# Patient Record
Sex: MALE | Race: WHITE | URBAN - METROPOLITAN AREA
[De-identification: names, ages, dates, MRNs, and addresses within clinical notes are randomized per-mention and may not be internally consistent; named-entity substitution may affect disease eponyms.]

---

## 2017-05-05 ENCOUNTER — OPTICAL OFFICE (OUTPATIENT)
Dept: URBAN - METROPOLITAN AREA CLINIC 143 | Facility: CLINIC | Age: 22
Setting detail: OPHTHALMOLOGY
End: 2017-05-05

## 2017-05-05 ENCOUNTER — DOCTOR'S OFFICE (OUTPATIENT)
Dept: URBAN - METROPOLITAN AREA CLINIC 136 | Facility: CLINIC | Age: 22
Setting detail: OPHTHALMOLOGY
End: 2017-05-05
Payer: COMMERCIAL

## 2017-05-05 DIAGNOSIS — H52.13: ICD-10-CM

## 2017-05-05 DIAGNOSIS — H52.223: ICD-10-CM

## 2017-05-05 PROCEDURE — 92310 CONTACT LENS FITTING OU: CPT | Performed by: OPTOMETRIST

## 2017-05-05 PROCEDURE — S0500 DISPOS CONT LENS: HCPCS | Performed by: OPTOMETRIST

## 2017-05-05 PROCEDURE — 92014 COMPRE OPH EXAM EST PT 1/>: CPT | Performed by: OPTOMETRIST

## 2017-05-05 ASSESSMENT — REFRACTION_MANIFEST
OD_VA3: 20/
OD_VA1: 20/
OS_VA1: 20/
OD_VA1: 20/
OU_VA: 20/
OS_VA2: 20/
OS_VA3: 20/
OS_VA2: 20/
OD_VA3: 20/
OD_VA2: 20/
OD_VA2: 20/
OS_VA1: 20/
OS_VA3: 20/
OU_VA: 20/

## 2017-05-05 ASSESSMENT — REFRACTION_CURRENTRX
OD_OVR_VA: 20/
OS_OVR_VA: 20/
OD_OVR_VA: 20/
OS_OVR_VA: 20/
OD_OVR_VA: 20/
OS_OVR_VA: 20/

## 2017-05-05 ASSESSMENT — REFRACTION_OUTSIDERX
OD_CYLINDER: -0.50
OS_CYLINDER: SPH
OS_VA1: 20/20
OD_VA3: 20/
OS_VA3: 20/
OU_VA: 20/20
OD_VA2: 20/20
OD_VA1: 20/20
OS_SPHERE: -3.75
OD_AXIS: 180
OS_VA2: 20/20
OD_SPHERE: -3.75

## 2017-05-05 ASSESSMENT — CONFRONTATIONAL VISUAL FIELD TEST (CVF)
OD_FINDINGS: FULL
OS_FINDINGS: FULL

## 2017-05-05 ASSESSMENT — VISUAL ACUITY
OD_BCVA: 20/20
OS_BCVA: 20/25+2

## 2017-07-03 ENCOUNTER — OPTICAL OFFICE (OUTPATIENT)
Dept: URBAN - METROPOLITAN AREA CLINIC 143 | Facility: CLINIC | Age: 22
Setting detail: OPHTHALMOLOGY
End: 2017-07-03

## 2017-07-03 DIAGNOSIS — H52.13: ICD-10-CM

## 2017-07-03 PROCEDURE — S0500 DISPOS CONT LENS: HCPCS | Performed by: OPTOMETRIST

## 2017-09-22 ENCOUNTER — OPTICAL OFFICE (OUTPATIENT)
Dept: URBAN - METROPOLITAN AREA CLINIC 143 | Facility: CLINIC | Age: 22
Setting detail: OPHTHALMOLOGY
End: 2017-09-22

## 2017-09-22 DIAGNOSIS — H52.223: ICD-10-CM

## 2017-09-22 PROCEDURE — S0500 DISPOS CONT LENS: HCPCS | Performed by: OPTOMETRIST

## 2018-03-05 ENCOUNTER — OPTICAL OFFICE (OUTPATIENT)
Dept: URBAN - METROPOLITAN AREA CLINIC 143 | Facility: CLINIC | Age: 23
Setting detail: OPHTHALMOLOGY
End: 2018-03-05

## 2018-03-05 DIAGNOSIS — H52.13: ICD-10-CM

## 2018-03-05 PROCEDURE — S0500 DISPOS CONT LENS: HCPCS | Performed by: OPTOMETRIST

## 2018-05-07 ENCOUNTER — DOCTOR'S OFFICE (OUTPATIENT)
Dept: URBAN - METROPOLITAN AREA CLINIC 136 | Facility: CLINIC | Age: 23
Setting detail: OPHTHALMOLOGY
End: 2018-05-07
Payer: COMMERCIAL

## 2018-05-07 DIAGNOSIS — H52.223: ICD-10-CM

## 2018-05-07 DIAGNOSIS — H52.13: ICD-10-CM

## 2018-05-07 PROCEDURE — 92014 COMPRE OPH EXAM EST PT 1/>: CPT | Performed by: OPTOMETRIST

## 2018-05-07 ASSESSMENT — REFRACTION_CURRENTRX
OS_OVR_VA: 20/
OD_OVR_VA: 20/
OD_OVR_VA: 20/
OS_OVR_VA: 20/
OD_OVR_VA: 20/
OS_OVR_VA: 20/

## 2018-05-07 ASSESSMENT — CONFRONTATIONAL VISUAL FIELD TEST (CVF)
OS_FINDINGS: FULL
OD_FINDINGS: FULL

## 2018-05-07 ASSESSMENT — REFRACTION_MANIFEST
OD_VA2: 20/
OD_VA1: 20/
OS_VA3: 20/
OS_VA1: 20/
OD_VA2: 20/
OS_VA3: 20/
OU_VA: 20/
OD_VA3: 20/
OU_VA: 20/
OD_VA3: 20/
OD_VA1: 20/
OS_VA1: 20/
OS_VA2: 20/
OS_VA2: 20/

## 2018-05-07 ASSESSMENT — REFRACTION_OUTSIDERX
OS_VA3: 20/
OD_CYLINDER: -0.50
OD_VA2: 20/20
OD_SPHERE: -3.75
OD_AXIS: 180
OS_VA2: 20/20
OS_SPHERE: -3.75
OS_CYLINDER: SPH
OD_VA1: 20/20
OU_VA: 20/20
OD_VA3: 20/
OS_VA1: 20/20

## 2018-05-07 ASSESSMENT — VISUAL ACUITY
OS_BCVA: 20/20-1
OD_BCVA: 20/20

## 2019-10-01 ENCOUNTER — DOCTOR'S OFFICE (OUTPATIENT)
Dept: URBAN - METROPOLITAN AREA CLINIC 136 | Facility: CLINIC | Age: 24
Setting detail: OPHTHALMOLOGY
End: 2019-10-01
Payer: COMMERCIAL

## 2019-10-01 DIAGNOSIS — H52.13: ICD-10-CM

## 2019-10-01 DIAGNOSIS — H52.223: ICD-10-CM

## 2019-10-01 PROCEDURE — 92014 COMPRE OPH EXAM EST PT 1/>: CPT | Performed by: OPTOMETRIST

## 2019-10-01 PROCEDURE — 92310 CONTACT LENS FITTING OU: CPT | Performed by: OPTOMETRIST

## 2019-10-01 PROCEDURE — 92015 DETERMINE REFRACTIVE STATE: CPT | Performed by: OPTOMETRIST

## 2019-10-01 ASSESSMENT — REFRACTION_CURRENTRX
OS_OVR_VA: 20/
OS_OVR_VA: 20/
OD_OVR_VA: 20/
OD_OVR_VA: 20/
OS_OVR_VA: 20/
OD_OVR_VA: 20/

## 2019-10-01 ASSESSMENT — REFRACTION_AUTOREFRACTION
OD_AXIS: 173
OD_CYLINDER: -1.00
OS_SPHERE: -3.00
OD_SPHERE: -3.50
OS_CYLINDER: -1.25
OS_AXIS: 6

## 2019-10-01 ASSESSMENT — REFRACTION_MANIFEST
OD_VA1: 20/
OD_VA2: 20/20
OS_VA2: 20/
OS_VA3: 20/
OS_VA3: 20/
OD_SPHERE: -3.75
OD_VA2: 20/
OD_VA3: 20/
OU_VA: 20/20
OS_SPHERE: -3.75
OS_CYLINDER: SPH
OD_AXIS: 180
OS_VA1: 20/
OS_VA1: 20/20
OD_CYLINDER: -0.50
OU_VA: 20/
OS_VA2: 20/20
OD_VA1: 20/20
OD_VA3: 20/

## 2019-10-01 ASSESSMENT — SPHEQUIV_DERIVED
OD_SPHEQUIV: -4
OD_SPHEQUIV: -4
OS_SPHEQUIV: -3.625

## 2019-10-01 ASSESSMENT — VISUAL ACUITY
OS_BCVA: 20/20-1
OD_BCVA: 20/20

## 2019-10-01 ASSESSMENT — CONFRONTATIONAL VISUAL FIELD TEST (CVF)
OD_FINDINGS: FULL
OS_FINDINGS: FULL

## 2021-07-05 ENCOUNTER — APPOINTMENT (EMERGENCY)
Dept: RADIOLOGY | Facility: HOSPITAL | Age: 26
DRG: 364 | End: 2021-07-05
Payer: COMMERCIAL

## 2021-07-05 ENCOUNTER — HOSPITAL ENCOUNTER (EMERGENCY)
Facility: HOSPITAL | Age: 26
Discharge: HOME | DRG: 364 | End: 2021-07-05
Attending: EMERGENCY MEDICINE | Admitting: EMERGENCY MEDICINE
Payer: COMMERCIAL

## 2021-07-05 VITALS
TEMPERATURE: 98.1 F | SYSTOLIC BLOOD PRESSURE: 147 MMHG | HEIGHT: 67 IN | OXYGEN SATURATION: 99 % | RESPIRATION RATE: 18 BRPM | WEIGHT: 180 LBS | DIASTOLIC BLOOD PRESSURE: 78 MMHG | BODY MASS INDEX: 28.25 KG/M2 | HEART RATE: 58 BPM

## 2021-07-05 DIAGNOSIS — L08.9 INFECTED DOG BITE OF FINGER, INITIAL ENCOUNTER: Primary | ICD-10-CM
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
## 2021-07-05 DIAGNOSIS — S61.259A INFECTED DOG BITE OF FINGER, INITIAL ENCOUNTER: Primary | ICD-10-CM

## 2021-07-05 LAB
ALBUMIN SERPL BCP-MCNC: 4.3 G/DL (ref 3.5–5)
ALP SERPL-CCNC: 94 U/L (ref 46–116)
ALT SERPL W P-5'-P-CCNC: 41 U/L (ref 12–78)
ANION GAP SERPL CALCULATED.3IONS-SCNC: 10 MMOL/L (ref 4–13)
APTT PPP: 27 SECONDS (ref 23–37)
AST SERPL W P-5'-P-CCNC: 24 U/L (ref 5–45)
BASOPHILS # BLD AUTO: 0.04 THOUSANDS/ΜL (ref 0–0.1)
BASOPHILS NFR BLD AUTO: 0 % (ref 0–1)
BILIRUB SERPL-MCNC: 0.48 MG/DL (ref 0.2–1)
BUN SERPL-MCNC: 20 MG/DL (ref 5–25)
CALCIUM SERPL-MCNC: 8.9 MG/DL (ref 8.3–10.1)
CHLORIDE SERPL-SCNC: 103 MMOL/L (ref 100–108)
CO2 SERPL-SCNC: 27 MMOL/L (ref 21–32)
CREAT SERPL-MCNC: 1.22 MG/DL (ref 0.6–1.3)
CRP SERPL HS-MCNC: <0.9 MG/L
EOSINOPHIL # BLD AUTO: 0.13 THOUSAND/ΜL (ref 0–0.61)
EOSINOPHIL NFR BLD AUTO: 1 % (ref 0–6)
ERYTHROCYTE [DISTWIDTH] IN BLOOD BY AUTOMATED COUNT: 12.7 % (ref 11.6–15.1)
GFR SERPL CREATININE-BSD FRML MDRD: 82 ML/MIN/1.73SQ M
GLUCOSE SERPL-MCNC: 98 MG/DL (ref 65–140)
HCT VFR BLD AUTO: 45.4 % (ref 36.5–49.3)
HGB BLD-MCNC: 15.1 G/DL (ref 12–17)
IMM GRANULOCYTES # BLD AUTO: 0.05 THOUSAND/UL (ref 0–0.2)
IMM GRANULOCYTES NFR BLD AUTO: 1 % (ref 0–2)
INR PPP: 0.96 (ref 0.84–1.19)
LACTATE SERPL-SCNC: 1 MMOL/L (ref 0.5–2)
LYMPHOCYTES # BLD AUTO: 2.75 THOUSANDS/ΜL (ref 0.6–4.47)
LYMPHOCYTES NFR BLD AUTO: 25 % (ref 14–44)
MCH RBC QN AUTO: 27.6 PG (ref 26.8–34.3)
MCHC RBC AUTO-ENTMCNC: 33.3 G/DL (ref 31.4–37.4)
MCV RBC AUTO: 83 FL (ref 82–98)
MONOCYTES # BLD AUTO: 0.74 THOUSAND/ΜL (ref 0.17–1.22)
MONOCYTES NFR BLD AUTO: 7 % (ref 4–12)
NEUTROPHILS # BLD AUTO: 7.26 THOUSANDS/ΜL (ref 1.85–7.62)
NEUTS SEG NFR BLD AUTO: 66 % (ref 43–75)
NRBC BLD AUTO-RTO: 0 /100 WBCS
PLATELET # BLD AUTO: 240 THOUSANDS/UL (ref 149–390)
PMV BLD AUTO: 10.8 FL (ref 8.9–12.7)
POTASSIUM SERPL-SCNC: 4.1 MMOL/L (ref 3.5–5.3)
PROT SERPL-MCNC: 7.4 G/DL (ref 6.4–8.2)
PROTHROMBIN TIME: 12.9 SECONDS (ref 11.6–14.5)
RBC # BLD AUTO: 5.47 MILLION/UL (ref 3.88–5.62)
SODIUM SERPL-SCNC: 140 MMOL/L (ref 136–145)
WBC # BLD AUTO: 10.97 THOUSAND/UL (ref 4.31–10.16)

## 2021-07-05 PROCEDURE — 36415 COLL VENOUS BLD VENIPUNCTURE: CPT | Performed by: EMERGENCY MEDICINE

## 2021-07-05 PROCEDURE — 96365 THER/PROPH/DIAG IV INF INIT: CPT

## 2021-07-05 PROCEDURE — 83605 ASSAY OF LACTIC ACID: CPT | Performed by: EMERGENCY MEDICINE

## 2021-07-05 PROCEDURE — 85730 THROMBOPLASTIN TIME PARTIAL: CPT | Performed by: EMERGENCY MEDICINE

## 2021-07-05 PROCEDURE — 85610 PROTHROMBIN TIME: CPT | Performed by: EMERGENCY MEDICINE

## 2021-07-05 PROCEDURE — 90471 IMMUNIZATION ADMIN: CPT

## 2021-07-05 PROCEDURE — 80053 COMPREHEN METABOLIC PANEL: CPT | Performed by: EMERGENCY MEDICINE

## 2021-07-05 PROCEDURE — 99284 EMERGENCY DEPT VISIT MOD MDM: CPT

## 2021-07-05 PROCEDURE — 99284 EMERGENCY DEPT VISIT MOD MDM: CPT | Performed by: EMERGENCY MEDICINE

## 2021-07-05 PROCEDURE — 96375 TX/PRO/DX INJ NEW DRUG ADDON: CPT

## 2021-07-05 PROCEDURE — 73140 X-RAY EXAM OF FINGER(S): CPT

## 2021-07-05 PROCEDURE — 86141 C-REACTIVE PROTEIN HS: CPT | Performed by: EMERGENCY MEDICINE

## 2021-07-05 PROCEDURE — 85025 COMPLETE CBC W/AUTO DIFF WBC: CPT | Performed by: EMERGENCY MEDICINE

## 2021-07-05 PROCEDURE — 90715 TDAP VACCINE 7 YRS/> IM: CPT | Performed by: EMERGENCY MEDICINE

## 2021-07-05 PROCEDURE — 87040 BLOOD CULTURE FOR BACTERIA: CPT | Performed by: EMERGENCY MEDICINE

## 2021-07-05 RX ORDER — AMOXICILLIN AND CLAVULANATE POTASSIUM 875; 125 MG/1; MG/1
1 TABLET, FILM COATED ORAL 2 TIMES DAILY
Qty: 14 TABLET | Refills: 0 | Status: SHIPPED | OUTPATIENT
Start: 2021-07-06 | End: 2021-07-06 | Stop reason: SDUPTHER

## 2021-07-05 RX ORDER — KETOROLAC TROMETHAMINE 30 MG/ML
30 INJECTION, SOLUTION INTRAMUSCULAR; INTRAVENOUS ONCE
Status: COMPLETED | OUTPATIENT
Start: 2021-07-05 | End: 2021-07-05

## 2021-07-05 RX ADMIN — KETOROLAC TROMETHAMINE 30 MG: 30 INJECTION, SOLUTION INTRAMUSCULAR at 20:38

## 2021-07-05 RX ADMIN — TETANUS TOXOID, REDUCED DIPHTHERIA TOXOID AND ACELLULAR PERTUSSIS VACCINE, ADSORBED 0.5 ML: 5; 2.5; 8; 8; 2.5 SUSPENSION INTRAMUSCULAR at 20:39

## 2021-07-05 RX ADMIN — SODIUM CHLORIDE 3 G: 9 INJECTION, SOLUTION INTRAVENOUS at 20:43

## 2021-07-05 NOTE — Clinical Note
Laura Cagle was seen and treated in our emergency department on 7/5/2021  Diagnosis:     Kodi    He may return on this date: 07/07/2021         If you have any questions or concerns, please don't hesitate to call        Bernie Hameed RN    ______________________________           _______________          _______________  Hospital Representative                              Date                                Time

## 2021-07-05 NOTE — Clinical Note
Keysha Saeed was seen and treated in our emergency department on 7/5/2021  Diagnosis:     Kodi    He may return on this date: 07/07/2021         If you have any questions or concerns, please don't hesitate to call        Sona Dickinson RN    ______________________________           _______________          _______________  Hospital Representative                              Date                                Time

## 2021-07-05 NOTE — Clinical Note
Jonn Bowling was seen and treated in our emergency department on 7/5/2021  Diagnosis:     Sumit Litter  may return to work on return date  He may return on this date: 07/07/2021         If you have any questions or concerns, please don't hesitate to call        Nabil Araujo RN    ______________________________           _______________          _______________  Hospital Representative                              Date                                Time

## 2021-07-05 NOTE — Clinical Note
Pawan Po was seen and treated in our emergency department on 7/5/2021  Diagnosis:     Rizwana Allen  may return to work on return date  He may return on this date: 07/07/2021         If you have any questions or concerns, please don't hesitate to call        Josh Franco RN    ______________________________           _______________          _______________  Hospital Representative                              Date                                Time

## 2021-07-05 NOTE — Clinical Note
Cecil Olivarez was seen and treated in our emergency department on 7/5/2021  Diagnosis:     Kodi    He may return on this date: 07/07/2021         If you have any questions or concerns, please don't hesitate to call        Poly Walker RN    ______________________________           _______________          _______________  Hospital Representative                              Date                                Time

## 2021-07-06 ENCOUNTER — ANESTHESIA EVENT (OUTPATIENT)
Dept: PERIOP | Facility: HOSPITAL | Age: 26
DRG: 364 | End: 2021-07-06
Payer: COMMERCIAL

## 2021-07-06 ENCOUNTER — ANESTHESIA (OUTPATIENT)
Dept: PERIOP | Facility: HOSPITAL | Age: 26
DRG: 364 | End: 2021-07-06
Payer: COMMERCIAL

## 2021-07-06 ENCOUNTER — HOSPITAL ENCOUNTER (INPATIENT)
Facility: HOSPITAL | Age: 26
LOS: 2 days | Discharge: HOME | DRG: 364 | End: 2021-07-08
Attending: EMERGENCY MEDICINE | Admitting: SURGERY
Payer: COMMERCIAL

## 2021-07-06 DIAGNOSIS — S61.259D DOG BITE OF FINGER, SUBSEQUENT ENCOUNTER: ICD-10-CM

## 2021-07-06 DIAGNOSIS — M65.9 FLEXOR TENOSYNOVITIS OF FINGER: Primary | ICD-10-CM
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
## 2021-07-06 DIAGNOSIS — L08.9 INFECTED DOG BITE OF FINGER, INITIAL ENCOUNTER: ICD-10-CM
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
## 2021-07-06 DIAGNOSIS — S61.259A INFECTED DOG BITE OF FINGER, INITIAL ENCOUNTER: ICD-10-CM

## 2021-07-06 LAB
ALBUMIN SERPL BCP-MCNC: 4.3 G/DL (ref 3.5–5)
ALP SERPL-CCNC: 100 U/L (ref 46–116)
ALT SERPL W P-5'-P-CCNC: 37 U/L (ref 12–78)
ANION GAP SERPL CALCULATED.3IONS-SCNC: 7 MMOL/L (ref 4–13)
AST SERPL W P-5'-P-CCNC: 16 U/L (ref 5–45)
BASOPHILS # BLD AUTO: 0.02 THOUSANDS/ΜL (ref 0–0.1)
BASOPHILS NFR BLD AUTO: 0 % (ref 0–1)
BILIRUB SERPL-MCNC: 0.57 MG/DL (ref 0.2–1)
BUN SERPL-MCNC: 17 MG/DL (ref 5–25)
CALCIUM SERPL-MCNC: 8.8 MG/DL (ref 8.3–10.1)
CHLORIDE SERPL-SCNC: 107 MMOL/L (ref 100–108)
CO2 SERPL-SCNC: 28 MMOL/L (ref 21–32)
CREAT SERPL-MCNC: 1.07 MG/DL (ref 0.6–1.3)
EOSINOPHIL # BLD AUTO: 0.09 THOUSAND/ΜL (ref 0–0.61)
EOSINOPHIL NFR BLD AUTO: 1 % (ref 0–6)
ERYTHROCYTE [DISTWIDTH] IN BLOOD BY AUTOMATED COUNT: 12.8 % (ref 11.6–15.1)
GFR SERPL CREATININE-BSD FRML MDRD: 96 ML/MIN/1.73SQ M
GLUCOSE SERPL-MCNC: 100 MG/DL (ref 65–140)
HCT VFR BLD AUTO: 46.6 % (ref 36.5–49.3)
HGB BLD-MCNC: 15.3 G/DL (ref 12–17)
IMM GRANULOCYTES # BLD AUTO: 0.03 THOUSAND/UL (ref 0–0.2)
IMM GRANULOCYTES NFR BLD AUTO: 0 % (ref 0–2)
LYMPHOCYTES # BLD AUTO: 1.5 THOUSANDS/ΜL (ref 0.6–4.47)
LYMPHOCYTES NFR BLD AUTO: 21 % (ref 14–44)
MCH RBC QN AUTO: 27.5 PG (ref 26.8–34.3)
MCHC RBC AUTO-ENTMCNC: 32.8 G/DL (ref 31.4–37.4)
MCV RBC AUTO: 84 FL (ref 82–98)
MONOCYTES # BLD AUTO: 0.57 THOUSAND/ΜL (ref 0.17–1.22)
MONOCYTES NFR BLD AUTO: 8 % (ref 4–12)
NEUTROPHILS # BLD AUTO: 5.04 THOUSANDS/ΜL (ref 1.85–7.62)
NEUTS SEG NFR BLD AUTO: 70 % (ref 43–75)
NRBC BLD AUTO-RTO: 0 /100 WBCS
PLATELET # BLD AUTO: 190 THOUSANDS/UL (ref 149–390)
PLATELET # BLD AUTO: 201 THOUSANDS/UL (ref 149–390)
PMV BLD AUTO: 10.6 FL (ref 8.9–12.7)
PMV BLD AUTO: 10.6 FL (ref 8.9–12.7)
POTASSIUM SERPL-SCNC: 4.3 MMOL/L (ref 3.5–5.3)
PROT SERPL-MCNC: 7.5 G/DL (ref 6.4–8.2)
RBC # BLD AUTO: 5.56 MILLION/UL (ref 3.88–5.62)
SODIUM SERPL-SCNC: 142 MMOL/L (ref 136–145)
WBC # BLD AUTO: 7.25 THOUSAND/UL (ref 4.31–10.16)

## 2021-07-06 PROCEDURE — 80053 COMPREHEN METABOLIC PANEL: CPT | Performed by: PHYSICIAN ASSISTANT

## 2021-07-06 PROCEDURE — 99254 IP/OBS CNSLTJ NEW/EST MOD 60: CPT | Performed by: SURGERY

## 2021-07-06 PROCEDURE — 96375 TX/PRO/DX INJ NEW DRUG ADDON: CPT

## 2021-07-06 PROCEDURE — 36415 COLL VENOUS BLD VENIPUNCTURE: CPT | Performed by: PHYSICIAN ASSISTANT

## 2021-07-06 PROCEDURE — 85025 COMPLETE CBC W/AUTO DIFF WBC: CPT | Performed by: PHYSICIAN ASSISTANT

## 2021-07-06 PROCEDURE — 85049 AUTOMATED PLATELET COUNT: CPT | Performed by: PHYSICIAN ASSISTANT

## 2021-07-06 PROCEDURE — 96365 THER/PROPH/DIAG IV INF INIT: CPT

## 2021-07-06 PROCEDURE — 99285 EMERGENCY DEPT VISIT HI MDM: CPT | Performed by: PHYSICIAN ASSISTANT

## 2021-07-06 PROCEDURE — 87075 CULTR BACTERIA EXCEPT BLOOD: CPT | Performed by: SURGERY

## 2021-07-06 PROCEDURE — 87205 SMEAR GRAM STAIN: CPT | Performed by: SURGERY

## 2021-07-06 PROCEDURE — 26020 DRAIN HAND TENDON SHEATH: CPT | Performed by: SURGERY

## 2021-07-06 PROCEDURE — 99284 EMERGENCY DEPT VISIT MOD MDM: CPT

## 2021-07-06 PROCEDURE — 87070 CULTURE OTHR SPECIMN AEROBIC: CPT | Performed by: SURGERY

## 2021-07-06 PROCEDURE — 99222 1ST HOSP IP/OBS MODERATE 55: CPT | Performed by: SURGERY

## 2021-07-06 PROCEDURE — 0J9J0ZZ DRAINAGE OF RIGHT HAND SUBCUTANEOUS TISSUE AND FASCIA, OPEN APPROACH: ICD-10-PCS | Performed by: SURGERY

## 2021-07-06 RX ORDER — KETOROLAC TROMETHAMINE 30 MG/ML
15 INJECTION, SOLUTION INTRAMUSCULAR; INTRAVENOUS ONCE
Status: COMPLETED | OUTPATIENT
Start: 2021-07-06 | End: 2021-07-06

## 2021-07-06 RX ORDER — HYDROMORPHONE HCL/PF 1 MG/ML
0.5 SYRINGE (ML) INJECTION
Status: DISCONTINUED | OUTPATIENT
Start: 2021-07-06 | End: 2021-07-08 | Stop reason: HOSPADM

## 2021-07-06 RX ORDER — FENTANYL CITRATE/PF 50 MCG/ML
50 SYRINGE (ML) INJECTION
Status: DISCONTINUED | OUTPATIENT
Start: 2021-07-06 | End: 2021-07-06 | Stop reason: HOSPADM

## 2021-07-06 RX ORDER — PROPOFOL 10 MG/ML
INJECTION, EMULSION INTRAVENOUS CONTINUOUS PRN
Status: DISCONTINUED | OUTPATIENT
Start: 2021-07-06 | End: 2021-07-06

## 2021-07-06 RX ORDER — ONDANSETRON 2 MG/ML
4 INJECTION INTRAMUSCULAR; INTRAVENOUS EVERY 6 HOURS PRN
Status: DISCONTINUED | OUTPATIENT
Start: 2021-07-06 | End: 2021-07-08 | Stop reason: HOSPADM

## 2021-07-06 RX ORDER — OXYCODONE HYDROCHLORIDE 5 MG/1
5 TABLET ORAL EVERY 4 HOURS PRN
Status: DISCONTINUED | OUTPATIENT
Start: 2021-07-06 | End: 2021-07-08 | Stop reason: HOSPADM

## 2021-07-06 RX ORDER — OXYCODONE HYDROCHLORIDE 5 MG/1
2.5 TABLET ORAL EVERY 4 HOURS PRN
Status: DISCONTINUED | OUTPATIENT
Start: 2021-07-06 | End: 2021-07-08 | Stop reason: HOSPADM

## 2021-07-06 RX ORDER — AMOXICILLIN AND CLAVULANATE POTASSIUM 875; 125 MG/1; MG/1
1 TABLET, FILM COATED ORAL 2 TIMES DAILY
Qty: 14 TABLET | Refills: 0 | Status: ON HOLD | OUTPATIENT
Start: 2021-07-06 | End: 2021-07-08 | Stop reason: SDUPTHER

## 2021-07-06 RX ORDER — ONDANSETRON 2 MG/ML
INJECTION INTRAMUSCULAR; INTRAVENOUS AS NEEDED
Status: DISCONTINUED | OUTPATIENT
Start: 2021-07-06 | End: 2021-07-06

## 2021-07-06 RX ORDER — CEFAZOLIN SODIUM 2 G/50ML
2000 SOLUTION INTRAVENOUS ONCE
Status: CANCELLED | OUTPATIENT
Start: 2021-07-06 | End: 2021-07-06

## 2021-07-06 RX ORDER — ONDANSETRON 2 MG/ML
4 INJECTION INTRAMUSCULAR; INTRAVENOUS ONCE AS NEEDED
Status: DISCONTINUED | OUTPATIENT
Start: 2021-07-06 | End: 2021-07-06 | Stop reason: HOSPADM

## 2021-07-06 RX ORDER — FENTANYL CITRATE 50 UG/ML
INJECTION, SOLUTION INTRAMUSCULAR; INTRAVENOUS AS NEEDED
Status: DISCONTINUED | OUTPATIENT
Start: 2021-07-06 | End: 2021-07-06

## 2021-07-06 RX ORDER — KETAMINE HYDROCHLORIDE 50 MG/ML
INJECTION, SOLUTION, CONCENTRATE INTRAMUSCULAR; INTRAVENOUS AS NEEDED
Status: DISCONTINUED | OUTPATIENT
Start: 2021-07-06 | End: 2021-07-06

## 2021-07-06 RX ORDER — LANOLIN ALCOHOL/MO/W.PET/CERES
3 CREAM (GRAM) TOPICAL
Status: DISCONTINUED | OUTPATIENT
Start: 2021-07-06 | End: 2021-07-08 | Stop reason: HOSPADM

## 2021-07-06 RX ORDER — CEFAZOLIN SODIUM 2 G/50ML
SOLUTION INTRAVENOUS AS NEEDED
Status: DISCONTINUED | OUTPATIENT
Start: 2021-07-06 | End: 2021-07-06

## 2021-07-06 RX ORDER — SODIUM CHLORIDE, SODIUM GLUCONATE, SODIUM ACETATE, POTASSIUM CHLORIDE, MAGNESIUM CHLORIDE, SODIUM PHOSPHATE, DIBASIC, AND POTASSIUM PHOSPHATE .53; .5; .37; .037; .03; .012; .00082 G/100ML; G/100ML; G/100ML; G/100ML; G/100ML; G/100ML; G/100ML
125 INJECTION, SOLUTION INTRAVENOUS CONTINUOUS
Status: DISCONTINUED | OUTPATIENT
Start: 2021-07-06 | End: 2021-07-07

## 2021-07-06 RX ORDER — MIDAZOLAM HYDROCHLORIDE 2 MG/2ML
INJECTION, SOLUTION INTRAMUSCULAR; INTRAVENOUS AS NEEDED
Status: DISCONTINUED | OUTPATIENT
Start: 2021-07-06 | End: 2021-07-06

## 2021-07-06 RX ORDER — IBUPROFEN 600 MG/1
600 TABLET ORAL EVERY 6 HOURS PRN
Status: DISCONTINUED | OUTPATIENT
Start: 2021-07-06 | End: 2021-07-08 | Stop reason: HOSPADM

## 2021-07-06 RX ORDER — ACETAMINOPHEN 325 MG/1
975 TABLET ORAL EVERY 8 HOURS SCHEDULED
Status: DISCONTINUED | OUTPATIENT
Start: 2021-07-06 | End: 2021-07-08 | Stop reason: HOSPADM

## 2021-07-06 RX ORDER — PROPOFOL 10 MG/ML
INJECTION, EMULSION INTRAVENOUS AS NEEDED
Status: DISCONTINUED | OUTPATIENT
Start: 2021-07-06 | End: 2021-07-06

## 2021-07-06 RX ORDER — SODIUM CHLORIDE, SODIUM GLUCONATE, SODIUM ACETATE, POTASSIUM CHLORIDE, MAGNESIUM CHLORIDE, SODIUM PHOSPHATE, DIBASIC, AND POTASSIUM PHOSPHATE .53; .5; .37; .037; .03; .012; .00082 G/100ML; G/100ML; G/100ML; G/100ML; G/100ML; G/100ML; G/100ML
100 INJECTION, SOLUTION INTRAVENOUS CONTINUOUS
Status: CANCELLED | OUTPATIENT
Start: 2021-07-06

## 2021-07-06 RX ADMIN — KETAMINE HYDROCHLORIDE 10 MG: 50 INJECTION INTRAMUSCULAR; INTRAVENOUS at 17:40

## 2021-07-06 RX ADMIN — VANCOMYCIN HYDROCHLORIDE 1750 MG: 1 INJECTION, POWDER, LYOPHILIZED, FOR SOLUTION INTRAVENOUS at 14:59

## 2021-07-06 RX ADMIN — SODIUM CHLORIDE, SODIUM GLUCONATE, SODIUM ACETATE, POTASSIUM CHLORIDE, MAGNESIUM CHLORIDE, SODIUM PHOSPHATE, DIBASIC, AND POTASSIUM PHOSPHATE 125 ML/HR: .53; .5; .37; .037; .03; .012; .00082 INJECTION, SOLUTION INTRAVENOUS at 15:55

## 2021-07-06 RX ADMIN — Medication 3 MG: at 21:01

## 2021-07-06 RX ADMIN — PROPOFOL 20 MG: 10 INJECTION, EMULSION INTRAVENOUS at 17:33

## 2021-07-06 RX ADMIN — IBUPROFEN 600 MG: 600 TABLET ORAL at 20:54

## 2021-07-06 RX ADMIN — FENTANYL CITRATE 25 MCG: 50 INJECTION, SOLUTION INTRAMUSCULAR; INTRAVENOUS at 17:26

## 2021-07-06 RX ADMIN — PROPOFOL 30 MG: 10 INJECTION, EMULSION INTRAVENOUS at 17:30

## 2021-07-06 RX ADMIN — FENTANYL CITRATE 25 MCG: 50 INJECTION, SOLUTION INTRAMUSCULAR; INTRAVENOUS at 17:50

## 2021-07-06 RX ADMIN — OXYCODONE HYDROCHLORIDE 5 MG: 5 TABLET ORAL at 23:34

## 2021-07-06 RX ADMIN — MIDAZOLAM HYDROCHLORIDE 2 MG: 1 INJECTION, SOLUTION INTRAMUSCULAR; INTRAVENOUS at 17:25

## 2021-07-06 RX ADMIN — SODIUM CHLORIDE 3 G: 9 INJECTION, SOLUTION INTRAVENOUS at 13:21

## 2021-07-06 RX ADMIN — FENTANYL CITRATE 25 MCG: 50 INJECTION, SOLUTION INTRAMUSCULAR; INTRAVENOUS at 17:30

## 2021-07-06 RX ADMIN — PROPOFOL 50 MCG/KG/MIN: 10 INJECTION, EMULSION INTRAVENOUS at 17:26

## 2021-07-06 RX ADMIN — OXYCODONE HYDROCHLORIDE 5 MG: 5 TABLET ORAL at 19:21

## 2021-07-06 RX ADMIN — SODIUM CHLORIDE, SODIUM GLUCONATE, SODIUM ACETATE, POTASSIUM CHLORIDE, MAGNESIUM CHLORIDE, SODIUM PHOSPHATE, DIBASIC, AND POTASSIUM PHOSPHATE 125 ML/HR: .53; .5; .37; .037; .03; .012; .00082 INJECTION, SOLUTION INTRAVENOUS at 19:32

## 2021-07-06 RX ADMIN — ONDANSETRON 4 MG: 2 INJECTION INTRAMUSCULAR; INTRAVENOUS at 17:31

## 2021-07-06 RX ADMIN — FENTANYL CITRATE 25 MCG: 50 INJECTION, SOLUTION INTRAMUSCULAR; INTRAVENOUS at 17:54

## 2021-07-06 RX ADMIN — KETOROLAC TROMETHAMINE 15 MG: 30 INJECTION, SOLUTION INTRAMUSCULAR at 12:14

## 2021-07-06 RX ADMIN — KETAMINE HYDROCHLORIDE 30 MG: 50 INJECTION INTRAMUSCULAR; INTRAVENOUS at 17:26

## 2021-07-06 RX ADMIN — ACETAMINOPHEN 975 MG: 325 TABLET, FILM COATED ORAL at 21:01

## 2021-07-06 RX ADMIN — CEFAZOLIN SODIUM 2000 MG: 2 SOLUTION INTRAVENOUS at 17:27

## 2021-07-06 RX ADMIN — SODIUM CHLORIDE 3 G: 9 INJECTION, SOLUTION INTRAVENOUS at 20:53

## 2021-07-06 RX ADMIN — KETAMINE HYDROCHLORIDE 10 MG: 50 INJECTION INTRAMUSCULAR; INTRAVENOUS at 17:57

## 2021-07-06 NOTE — Clinical Note
Case was discussed with Dr Zeinab Bertrand and the patient's admission status was agreed to be Admission Status: inpatient status to the service of Dr Borges

## 2021-07-06 NOTE — ANESTHESIA PREPROCEDURE EVALUATION
Procedure:  DEBRIDEMENT HAND/FINGER [facility] OUT) (Right Hand)    Benign murmur of childhood    Relevant Problems   Other   (+) Dog bite of finger        Physical Exam    Airway    Mallampati score: I  TM Distance: >3 FB  Neck ROM: full     Dental   No notable dental hx     Cardiovascular  Rhythm: regular, Rate: normal, Cardiovascular exam normal    Pulmonary  Pulmonary exam normal Breath sounds clear to auscultation,     Other Findings        Anesthesia Plan  ASA Score- 1     Anesthesia Type- IV sedation with anesthesia with ASA Monitors  Additional Monitors:   Airway Plan:     Comment: Discussed risks/benefits, including medication reactions, awareness, aspiration, and serious/life threatening complications  Plan to maintain native airway with IVGA, monitored with EtCO2  Plan Factors-Exercise tolerance (METS): >4 METS  Patient summary reviewed  Patient instructed to abstain from smoking on day of procedure  Patient did not smoke on day of surgery  Induction- intravenous  Postoperative Plan-     Informed Consent- Anesthetic plan and risks discussed with patient  I personally reviewed this patient with the CRNA  Discussed and agreed on the Anesthesia Plan with the CRNA  Clemente Moreira

## 2021-07-06 NOTE — ED PROVIDER NOTES
History  Chief Complaint   Patient presents with    Dog Bite     pt was bit by a dog on his R middle finger (+) swollen and painful  Dog is up to date on his shots  Patient is a 23year old male who got bit by his girlfriends dog today at 1300 on his R middle finger  (+) increased pain and swelling since  [facility]  ?[facility] Td  No fever  No numbness  RHD  No recent old records from this ED seen on computer system  [facility] website checked on this patient and no Rx found  Called patient at 0700 this AM to notify him about e-prescribing error with Rx for augmentin and I tried e-prescribing it again and printed Rx out and patient to come back to ED for wound check and if he comes back in AM then he can get augmentin po in ED and this Rx and that he should call his pharmacy this AM to see if second Rx went through  History provided by:  Patient (girlfriend)   used: No    Dog Bite  Associated symptoms: no fever and no numbness        None       History reviewed  No pertinent past medical history  Past Surgical History:   Procedure Laterality Date    ANTERIOR CRUCIATE LIGAMENT REPAIR Left 2014       Family History   Problem Relation Age of Onset    Stroke Family      I have reviewed and agree with the history as documented  E-Cigarette/Vaping     E-Cigarette/Vaping Substances     Social History     Tobacco Use    Smoking status: Never Smoker    Smokeless tobacco: Never Used   Substance Use Topics    Alcohol use: Yes    Drug use: Never       Review of Systems   Constitutional: Negative for fever  Musculoskeletal: Positive for joint swelling  Skin: Positive for wound  Neurological: Negative for numbness  All other systems reviewed and are negative  Physical Exam  Physical Exam  Vitals and nursing note reviewed  Constitutional:       General: He is in acute distress (moderate)  HENT:      Head: Normocephalic and atraumatic  Mouth/Throat:      Mouth: Mucous membranes are moist    Eyes:      General: No scleral icterus  Cardiovascular:      Rate and Rhythm: Regular rhythm  Bradycardia present  Heart sounds: Normal heart sounds  No murmur heard  Pulmonary:      Effort: Pulmonary effort is normal  No respiratory distress  Breath sounds: Normal breath sounds  Abdominal:      General: Bowel sounds are normal       Palpations: Abdomen is soft  Tenderness: There is no abdominal tenderness  Musculoskeletal:         General: Swelling (R proximal middle finger) and tenderness (proximal middle finger on R) present  No deformity  Cervical back: Normal range of motion and neck supple  Right lower leg: No edema  Left lower leg: No edema  Comments: Rest of R UE nontender  Limited ROM due to pain  NVI  Skin:     General: Skin is warm and dry  Findings: Bruising and erythema (minimal) present  No rash  Comments: One small puncture wound on proximal medial aspect of R middle finger and one small puncture wound on lateral aspect of R middle finger  No pus noted  Neurological:      General: No focal deficit present  Mental Status: He is alert and oriented to person, place, and time     Psychiatric:         Mood and Affect: Mood normal          Vital Signs  ED Triage Vitals   Temperature Pulse Respirations Blood Pressure SpO2   07/05/21 1921 07/05/21 1921 07/05/21 1921 07/05/21 1921 07/05/21 1921   98 1 °F (36 7 °C) 58 18 147/78 99 %      Temp Source Heart Rate Source Patient Position - Orthostatic VS BP Location FiO2 (%)   07/05/21 1921 07/05/21 1921 07/05/21 1921 07/05/21 1921 --   Oral Monitor Lying Left arm       Pain Score       07/05/21 2038       7           Vitals:    07/05/21 1921   BP: 147/78   Pulse: 58   Patient Position - Orthostatic VS: Lying         Visual Acuity      ED Medications  Medications   tetanus-diphtheria-acellular pertussis (BOOSTRIX) IM injection 0 5 mL (0 5 mL Intramuscular Given 7/5/21 2039)   ampicillin-sulbactam (UNASYN) 3 g in sodium chloride 0 9 % 100 mL IVPB (0 g Intravenous Stopped 7/5/21 2113)   ketorolac (TORADOL) injection 30 mg (30 mg Intravenous Given 7/5/21 2038)       Diagnostic Studies  Results Reviewed     Procedure Component Value Units Date/Time    Blood culture #1 [[id number]] Collected: 07/05/21 2035    Lab Status: Preliminary result Specimen: Blood from Arm, Left Updated: 07/06/21 0101     Blood Culture Received in Microbiology Lab  Culture in Progress  Blood culture #2 [[id number]] Collected: 07/05/21 2035    Lab Status: Preliminary result Specimen: Blood from Arm, Right Updated: 07/06/21 0101     Blood Culture Received in Microbiology Lab  Culture in Progress  Lactic acid [[id number]]  (Normal) Collected: 07/05/21 2035    Lab Status: Final result Specimen: Blood from Arm, Left Updated: 07/05/21 2127     LACTIC ACID 1 0 mmol/L     Narrative:      Result may be elevated if tourniquet was used during collection      High sensitivity CRP [[id number]] Collected: 07/05/21 2035    Lab Status: Final result Specimen: Blood from Arm, Left Updated: 07/05/21 2119     CRP, High Sensitivity <0 90 mg/L     Narrative:            HsCRP Level       Relative Risk           <1 0 mg/L          Low           1 0 to 3 0 mg/L    Average           >3 0 mg/L          High        Comprehensive metabolic panel [[id number]] Collected: 07/05/21 2035    Lab Status: Final result Specimen: Blood from Arm, Left Updated: 07/05/21 2109     Sodium 140 mmol/L      Potassium 4 1 mmol/L      Chloride 103 mmol/L      CO2 27 mmol/L      ANION GAP 10 mmol/L      BUN 20 mg/dL      Creatinine 1 22 mg/dL      Glucose 98 mg/dL      Calcium 8 9 mg/dL      AST 24 U/L      ALT 41 U/L      Alkaline Phosphatase 94 U/L      Total Protein 7 4 g/dL      Albumin 4 3 g/dL      Total Bilirubin 0 48 mg/dL      eGFR 82 ml/min/1 73sq m     Narrative:      [place] guidelines for Chronic Kidney Disease (CKD):     Stage 1 with normal or high GFR (GFR > 90 mL/min/1 73 square meters)    Stage 2 Mild CKD (GFR = 60-89 mL/min/1 73 square meters)    Stage 3A Moderate CKD (GFR = 45-59 mL/min/1 73 square meters)    Stage 3B Moderate CKD (GFR = 30-44 mL/min/1 73 square meters)    Stage 4 Severe CKD (GFR = 15-29 mL/min/1 73 square meters)    Stage 5 End Stage CKD (GFR <15 mL/min/1 73 square meters)  Note: GFR calculation is accurate only with a steady state creatinine    Protime-INR [[id number]]  (Normal) Collected: 07/05/21 2035    Lab Status: Final result Specimen: Blood from Arm, Left Updated: 07/05/21 2059     Protime 12 9 seconds      INR 0 96    APTT [[id number]]  (Normal) Collected: 07/05/21 2035    Lab Status: Final result Specimen: Blood from Arm, Left Updated: 07/05/21 2059     PTT 27 seconds     CBC and differential [[id number]]  (Abnormal) Collected: 07/05/21 2035    Lab Status: Final result Specimen: Blood from Arm, Left Updated: 07/05/21 2053     WBC 10 97 Thousand/uL      RBC 5 47 Million/uL      Hemoglobin 15 1 g/dL      Hematocrit 45 4 %      MCV 83 fL      MCH 27 6 pg      MCHC 33 3 g/dL      RDW 12 7 %      MPV 10 8 fL      Platelets 462 Thousands/uL      nRBC 0 /100 WBCs      Neutrophils Relative 66 %      Immat GRANS % 1 %      Lymphocytes Relative 25 %      Monocytes Relative 7 %      Eosinophils Relative 1 %      Basophils Relative 0 %      Neutrophils Absolute 7 26 Thousands/µL      Immature Grans Absolute 0 05 Thousand/uL      Lymphocytes Absolute 2 75 Thousands/µL      Monocytes Absolute 0 74 Thousand/µL      Eosinophils Absolute 0 13 Thousand/µL      Basophils Absolute 0 04 Thousands/µL                  XR finger third digit-middle LEFT   ED Interpretation by Mamie Schaumann, MD (07/05 2109)   No fx or FB read by me                    Procedures  Procedures         ED Course  ED Course as of Jul 06 0710   Mon Jul 05, 2021 2217 Labs and x-ray d/w patient and girlfriend with patient's permission  Patient does not want to be admitted and will come back to ED tomorrow for wound check  Initial Sepsis Screening     Row Name 07/05/21 2112                Is the patient's history suggestive of a new or worsening infection? (!) Yes (Proceed)  -AO        Suspected source of infection  soft tissue  -AO        Are two or more of the following signs & symptoms of infection both present and new to the patient? No  -AO        Indicate SIRS criteria  --        If the answer is yes to both questions, suspicion of sepsis is present  --        If severe sepsis is present AND tissue hypoperfusion perists in the hour after fluid resuscitation or lactate > 4, the patient meets criteria for SEPTIC SHOCK  --        Are any of the following organ dysfunction criteria present within 6 hours of suspected infection and SIRS criteria that are NOT considered to be chronic conditions? --        Organ dysfunction  --        Date of presentation of severe sepsis  --        Time of presentation of severe sepsis  --        Tissue hypoperfusion persists in the hour after crystalloid fluid administration, evidenced, by either:  --        Was hypotension present within one hour of the conclusion of crystalloid fluid administration?  --        Date of presentation of septic shock  --        Time of presentation of septic shock  --          User Key  (r) = Recorded By, (t) = Taken By, (c) = Cosigned By    [address] Name Provider Hamilton Paniagua MD Physician          SBIRT 22yo+      Most Recent Value   SBIRT (25 yo +)   In order to provide better care to our patients, we are screening all of our patients for alcohol and drug use  Would it be okay to ask you these screening questions?   Unable to answer at this time Filed at: 07/05/2021 2036                    MDM  Number of Diagnoses or Management Options  Diagnosis management comments: DDx including but not limited to:  Bite wound, laceration, abrasion, puncture wound, cellulitis, wound infection, abscess, tenosynovitis, no need for rabies prophylaxis, tetanus prophylaxis; doubt osteomyelitis or necrotizing fasciitis  Amount and/or Complexity of Data Reviewed  Clinical lab tests: ordered and reviewed  Tests in the radiology section of CPT®: ordered and reviewed  Decide to obtain previous medical records or to obtain history from someone other than the patient: yes  Obtain history from someone other than the patient: yes  Independent visualization of images, tracings, or specimens: yes        Disposition  Final diagnoses:   Infected dog bite of finger, initial encounter     Time reflects when diagnosis was documented in both MDM as applicable and the Disposition within this note     Time User Action Codes Description Comment    7/5/2021 10:20 PM Bety Irwin Add [[id number],  [place]  0XXA] Dog bite of right hand, initial encounter     7/5/2021 10:20 PM Bety Irwin Add [[id number],  L08 9,  [place]  0XXA] Infected dog bite of finger, initial encounter     7/5/2021 10:20 PM Anmol Panchal,  L08 9,  [place]  0XXA] Infected dog bite of finger, initial encounter     7/5/2021 10:20 PM Marzena Panchal 27  0XXA] Dog bite of right hand, initial encounter       ED Disposition     ED Disposition Condition Date/Time Comment    Discharge Stable Mon Jul 5, 2021 10:20 PM Kodi Owen discharge to home/self care  Follow-up Information     Follow up With Specialties Details Why Contact Info Additional [address] Emergency Department Emergency Medicine Go in 1 day For wound re-check; return sooner if increased pain, fever, vomiting, pus, redness, red streaks, increased swelling, numbness, weakness  motrin/tylenol for pain   [address] Emergency Department, [address] [place] [address] [place], [place], [address]          Discharge Medication List as of 7/5/2021 10:22 PM      START taking these medications    Details   amoxicillin-clavulanate (Augmentin) 875-125 mg per tablet Take 1 tablet by mouth 2 (two) times a day for 7 days, Starting Tue 7/6/2021, Until Tue 7/13/2021, Normal           No discharge procedures on file      PDMP Review       Value Time User    PDMP Reviewed  Yes 7/5/2021  7:59 PM Baudilio Rock MD          ED Provider  Electronically Signed by           Baudilio Rock MD  07/05/21 9035       Baudilio Rock MD  07/06/21 4927

## 2021-07-06 NOTE — ED NOTES
Patient reports right sided neck itching as soon as the vanco started  All lines stopped and PA made aware        Danyel Schmidt RN  07/06/21 7375

## 2021-07-06 NOTE — ED PROVIDER NOTES
History  Chief Complaint   Patient presents with    Wound Check     pt got bit by a dog on his R hand, was told to come back in for re-eval and to  abx     Patient is a 44-year-old male presenting to the ED for evaluation of a dog bite to the right middle finger  Patient was bit by his girlfriend's dog yesterday around 1300  Patient was seen in the ED lasting given 1 dose of IV Unasyn and tetanus booster shot  WBC count 10 97 at that time and blood cultures were also obtained  He was offered admission at that time but declined  Patient was told to return today for a wound recheck  He has not yet started the Augmentin  Patient reports worsening pain, swelling and erythema to the digit  He is unable to fully extend the finger and has severe pain when attempting to do so  He denies fevers or chills  Prior to Admission Medications   Prescriptions Last Dose Informant Patient Reported? Taking?   amoxicillin-clavulanate (Augmentin) 875-125 mg per tablet   No Yes   Sig: Take 1 tablet by mouth 2 (two) times a day for 7 days      Facility-Administered Medications: None       History reviewed  No pertinent past medical history  Past Surgical History:   Procedure Laterality Date    ANTERIOR CRUCIATE LIGAMENT REPAIR Left 2014       Family History   Problem Relation Age of Onset    Stroke Family      I have reviewed and agree with the history as documented  E-Cigarette/Vaping     E-Cigarette/Vaping Substances     Social History     Tobacco Use    Smoking status: Never Smoker    Smokeless tobacco: Never Used   Substance Use Topics    Alcohol use: Yes    Drug use: Never       Review of Systems   Constitutional: Negative for chills, diaphoresis, fatigue and fever  HENT: Negative for congestion, ear pain, mouth sores, rhinorrhea, sinus pain, sore throat and trouble swallowing  Eyes: Negative for photophobia and visual disturbance     Respiratory: Negative for cough, chest tightness, shortness of breath and wheezing  Cardiovascular: Negative for chest pain, palpitations and leg swelling  Gastrointestinal: Negative for abdominal pain, blood in stool, constipation, diarrhea, nausea and vomiting  Genitourinary: Negative for dysuria, flank pain, frequency, hematuria and urgency  Musculoskeletal: Negative for arthralgias, back pain, gait problem, joint swelling, myalgias and neck pain  Skin: Positive for wound (right 3rd digit)  Negative for color change, pallor and rash  Neurological: Negative for dizziness, syncope, speech difficulty, weakness, light-headedness, numbness and headaches  Psychiatric/Behavioral: Negative for confusion and sleep disturbance  All other systems reviewed and are negative  Physical Exam  Physical Exam  Vitals and nursing note reviewed  Constitutional:       General: He is awake  He is not in acute distress  Appearance: Normal appearance  He is well-developed  He is not ill-appearing or diaphoretic  HENT:      Head: Normocephalic and atraumatic  Right Ear: External ear normal       Left Ear: External ear normal       Nose: Nose normal       Mouth/Throat:      Lips: Pink  Mouth: Mucous membranes are moist    Eyes:      General: Lids are normal  No scleral icterus  Conjunctiva/sclera: Conjunctivae normal       Pupils: Pupils are equal, round, and reactive to light  Cardiovascular:      Rate and Rhythm: Normal rate and regular rhythm  Pulses: Normal pulses  Radial pulses are 2+ on the right side and 2+ on the left side  Heart sounds: Normal heart sounds, S1 normal and S2 normal    Pulmonary:      Effort: Pulmonary effort is normal  No accessory muscle usage  Breath sounds: Normal breath sounds  No stridor  No decreased breath sounds, wheezing, rhonchi or rales  Abdominal:      General: Abdomen is flat  Bowel sounds are normal  There is no distension  Palpations: Abdomen is soft  Tenderness:  There is no abdominal tenderness  There is no right CVA tenderness, left CVA tenderness, guarding or rebound  Musculoskeletal:      Right hand: Swelling present  Arms:       Cervical back: Full passive range of motion without pain, normal range of motion and neck supple  No signs of trauma  No pain with movement  Normal range of motion  Right lower leg: No edema  Left lower leg: No edema  Lymphadenopathy:      Cervical: No cervical adenopathy  Skin:     General: Skin is warm and dry  Capillary Refill: Capillary refill takes less than 2 seconds  Coloration: Skin is not cyanotic, jaundiced or pale  Neurological:      Mental Status: He is alert and oriented to person, place, and time  GCS: GCS eye subscore is 4  GCS verbal subscore is 5  GCS motor subscore is 6  Cranial Nerves: No dysarthria or facial asymmetry  Gait: Gait normal    Psychiatric:         Attention and Perception: Attention normal          Mood and Affect: Mood normal          Speech: Speech normal          Behavior: Behavior normal  Behavior is cooperative           Vital Signs  ED Triage Vitals   Temperature Pulse Respirations Blood Pressure SpO2   07/06/21 1047 07/06/21 1047 07/06/21 1047 07/06/21 1047 07/06/21 1047   98 4 °F (36 9 °C) 74 16 135/90 98 %      Temp Source Heart Rate Source Patient Position - Orthostatic VS BP Location FiO2 (%)   07/06/21 1047 07/06/21 1047 07/06/21 1308 07/06/21 1308 --   Oral Monitor Lying Right arm       Pain Score       07/06/21 1047       Worst Possible Pain           Vitals:    07/06/21 1047 07/06/21 1308   BP: 135/90 128/83   Pulse: 74 62   Patient Position - Orthostatic VS:  Lying         Visual Acuity      ED Medications  Medications   ampicillin-sulbactam (UNASYN) 3 g in sodium chloride 0 9 % 100 mL IVPB (3 g Intravenous New Bag 7/6/21 1321)   vancomycin (VANCOCIN) 1,750 mg in sodium chloride 0 9 % 500 mL IVPB (has no administration in time range)   ondansetron (ZOFRAN) injection 4 mg (has no administration in time range)   multi-electrolyte (PLASMALYTE-A/ISOLYTE-S PH 7 4) IV solution (has no administration in time range)   enoxaparin (LOVENOX) subcutaneous injection 30 mg (has no administration in time range)   oxyCODONE (ROXICODONE) IR tablet 2 5 mg (has no administration in time range)   oxyCODONE (ROXICODONE) IR tablet 5 mg (has no administration in time range)   HYDROmorphone (DILAUDID) injection 0 5 mg (has no administration in time range)   ibuprofen (MOTRIN) tablet 600 mg (has no administration in time range)   ketorolac (TORADOL) injection 15 mg (15 mg Intravenous Given 7/6/21 1214)       Diagnostic Studies  Results Reviewed     Procedure Component Value Units Date/Time    Platelet count [[id number]]     Lab Status: No result Specimen: Blood     Comprehensive metabolic panel [[id number]] Collected: 07/06/21 1213    Lab Status: Final result Specimen: Blood from Arm, Left Updated: 07/06/21 1248     Sodium 142 mmol/L      Potassium 4 3 mmol/L      Chloride 107 mmol/L      CO2 28 mmol/L      ANION GAP 7 mmol/L      BUN 17 mg/dL      Creatinine 1 07 mg/dL      Glucose 100 mg/dL      Calcium 8 8 mg/dL      AST 16 U/L      ALT 37 U/L      Alkaline Phosphatase 100 U/L      Total Protein 7 5 g/dL      Albumin 4 3 g/dL      Total Bilirubin 0 57 mg/dL      eGFR 96 ml/min/1 73sq m     Narrative:      Latasha guidelines for Chronic Kidney Disease (CKD):     Stage 1 with normal or high GFR (GFR > 90 mL/min/1 73 square meters)    Stage 2 Mild CKD (GFR = 60-89 mL/min/1 73 square meters)    Stage 3A Moderate CKD (GFR = 45-59 mL/min/1 73 square meters)    Stage 3B Moderate CKD (GFR = 30-44 mL/min/1 73 square meters)    Stage 4 Severe CKD (GFR = 15-29 mL/min/1 73 square meters)    Stage 5 End Stage CKD (GFR <15 mL/min/1 73 square meters)  Note: GFR calculation is accurate only with a steady state creatinine    CBC and differential [[id number]] Collected: 07/06/21 1213    Lab Status: Final result Specimen: Blood from Arm, Left Updated: 07/06/21 1226     WBC 7 25 Thousand/uL      RBC 5 56 Million/uL      Hemoglobin 15 3 g/dL      Hematocrit 46 6 %      MCV 84 fL      MCH 27 5 pg      MCHC 32 8 g/dL      RDW 12 8 %      MPV 10 6 fL      Platelets 699 Thousands/uL      nRBC 0 /100 WBCs      Neutrophils Relative 70 %      Immat GRANS % 0 %      Lymphocytes Relative 21 %      Monocytes Relative 8 %      Eosinophils Relative 1 %      Basophils Relative 0 %      Neutrophils Absolute 5 04 Thousands/µL      Immature Grans Absolute 0 03 Thousand/uL      Lymphocytes Absolute 1 50 Thousands/µL      Monocytes Absolute 0 57 Thousand/µL      Eosinophils Absolute 0 09 Thousand/µL      Basophils Absolute 0 02 Thousands/µL                  No orders to display              Procedures  Procedures         ED Course                             SBIRT 22yo+      Most Recent Value   SBIRT (22 yo +)   In order to provide better care to our patients, we are screening all of our patients for alcohol and drug use  Would it be okay to ask you these screening questions? Unable to answer at this time Filed at: 07/06/2021 1109                    [facility]  Number of Diagnoses or Management Options  Dog bite of finger, subsequent encounter  Flexor tenosynovitis of finger  Diagnosis management comments: Patient is a 71-year-old male presenting to the ED for evaluation of a dog bite to the right middle finger  (+) Kanavel signs, concern for flexor tenosynovitis  Spoke with hand surgery who would like patient admitted with plan for OR this afternoon  IV antibiotics given in ED  Spoke with trauma service who will admit patient to their service  The management plan was discussed in detail with the patient at bedside and all questions were answered          Amount and/or Complexity of Data Reviewed  Clinical lab tests: ordered and reviewed  Discuss the patient with other providers: yes (Dr Lou Burns Dr  Mychal Keane  )    Patient Progress  Patient progress: stable      Disposition  Final diagnoses:   Flexor tenosynovitis of finger   Dog bite of finger, subsequent encounter     Time reflects when diagnosis was documented in both MDM as applicable and the Disposition within this note     Time User Action Codes Description Comment    7/6/2021 12:50 PM Doris Phillips Add [M65 9] Flexor tenosynovitis of finger     7/6/2021  1:02 PM Lorelie Primer Add Temi Bigness  0XXA] Dog bite, initial encounter     7/6/2021  1:02 PM Ca Francisco Modify [M65 9] Flexor tenosynovitis of finger     7/6/2021  1:02 PM Ca Francisco Remove [R82  0XXA] Dog bite, initial encounter     7/6/2021  1:03 PM Ca Francisco Add [X98 606B,  W54  0XXD] Dog bite of finger, subsequent encounter       ED Disposition     ED Disposition Condition Date/Time Comment    Admit Stable Tue Jul 6, 2021  1:37 PM Case was discussed with Dr Danica Keane and Dr Mychal Miguel and the patient's admission status was agreed to be Admission Status: inpatient status to the service of Dr Mychal Miguel  Follow-up Information    None         Patient's Medications   Discharge Prescriptions    No medications on file     No discharge procedures on file      PDMP Review       Value Time User    PDMP Reviewed  Yes 7/5/2021  7:59 PM Josh Bentley MD          ED Provider  Electronically Signed by           Ravi Carpio PA-C  07/06/21 2334

## 2021-07-06 NOTE — H&P
[address] [facility] 1995, 23 y o  male MRN: [id number]  Unit/Bed#: FT 01 Encounter: [id number]  Primary Care Provider: No primary care provider on file  Date and time admitted to hospital: 7/6/2021 10:46 AM    Dog bite of finger  Assessment & Plan  - Dog bite of the proximal right long finger laterally and medially on 7/5    - evidence of flexor tendon sheath infection on exam  - IV Unasyn and Vanc ordered  - OR today for washout with Hand Surgery  - confirmed Tdap Updated on 7/5/21  - pain control      Trauma Alert: Evaluation  Model of Arrival: Self  Trauma Team: Attending Tristen Gore and SALVATORE Stafford  Consultants: Other: Hand Surgery  ED staff spoke with Dr Tresa Sage on the phone    Chief Complaint: "My finger hurts"    History of Present Illness   HPI:  Yulia Martinez is a 23 y o  male who presents status post dog bite to the middle finger which occurred yesterday afternoon  Patient states that his girlfriend's dog bit him when he went to pet the dog  Was initially evaluated in the emergency department last night when his tetanus shot was updated and he was given a prescription for Augmentin  He was discharged and unable to fill the Augmentin prescription  He states he had a lot of pain in the hand last night and was unable to sleep  This morning he states this significantly more swollen with pain extending down into the joint and his hand  This prompted him to return to the emergency department today  He denies fever/chills  He denies being knocked to the ground by the dog or sustaining any other injuries  He has no other areas of pain and is feeling well otherwise  The emergency department staff called Hand surgery today and spoke with Dr Tresa Sage he would like to take the patient to the operating room this afternoon for washout  Mechanism:Other:  Dog bite    Review of Systems   Constitutional: Negative  HENT: Negative  Respiratory: Negative  Cardiovascular: Negative  Gastrointestinal: Negative  Genitourinary: Negative  Musculoskeletal: Positive for joint swelling         + pain and swelling to the right middle finger   Skin: Positive for wound  + dog bite wound to the middle finger on both sides   Neurological: Negative  Hematological: Negative  Psychiatric/Behavioral: Negative  12-point, complete review of systems was reviewed and negative except as stated above  Historical Information     History reviewed  No pertinent past medical history  Past Surgical History:   Procedure Laterality Date    ANTERIOR CRUCIATE LIGAMENT REPAIR Left 2014     Social History   Social History     Substance and Sexual Activity   Alcohol Use Yes     Social History     Substance and Sexual Activity   Drug Use Never     Social History     Tobacco Use   Smoking Status Never Smoker   Smokeless Tobacco Never Used     E-Cigarette/Vaping     E-Cigarette/Vaping Substances     Immunization History   Administered Date(s) Administered    Tdap 07/05/2021     Family History: Non-contributory      Meds/Allergies   Patient takes no daily medications  He was unable to fill Augmentin and did not take any of this medication  No Known Allergies      PHYSICAL EXAM      Objective   Vitals:   First set: Temperature: 98 4 °F (36 9 °C) (07/06/21 1047)  Pulse: 74 (07/06/21 1047)  Respirations: 16 (07/06/21 1047)  Blood Pressure: 135/90 (07/06/21 1047)    Primary Survey:   (A) Airway:  Intact  (B) Breathing:  Equal bilateral  (C) Circulation: Pulses:   normal  (D) Disabliity:  GCS Total:  15  (E) Expose:  Completed    Secondary Survey: (Click on Physical Exam tab above)  Physical Exam  Constitutional:       Appearance: Normal appearance  HENT:      Head: Normocephalic  Mouth/Throat:      Mouth: Mucous membranes are moist    Cardiovascular:      Rate and Rhythm: Normal rate and regular rhythm  Pulses: Normal pulses     Pulmonary:      Effort: Pulmonary effort is normal  No respiratory distress  Breath sounds: Normal breath sounds  Abdominal:      General: Abdomen is flat  There is no distension  Palpations: Abdomen is soft  Tenderness: There is no abdominal tenderness  There is no guarding  Musculoskeletal:      Cervical back: Normal range of motion and neck supple  No tenderness  Comments: + right long finger erythematous and edematous, proximally at the PIP joint, extending proximally through the phalanx to the MCP joint  Unable to flex or extend the finger, + Kanavel sign  Consistent with medial nerve distribution as well    + small puncture wound laterally and even smaller puncture wound medially over the proximal phalanx of the long finger on the right  + sensation intact distally to light tough in the long finger, and capillary refill < 2 seconds in the fingertip  Skin:     General: Skin is warm and dry  Capillary Refill: Capillary refill takes less than 2 seconds  Neurological:      General: No focal deficit present  Mental Status: He is alert and oriented to person, place, and time  Sensory: No sensory deficit  Motor: No weakness     Psychiatric:         Behavior: Behavior normal          Invasive Devices     Peripheral Intravenous Line            Peripheral IV 07/06/21 Left Antecubital <1 day                Lab Results:   Results: I have personally reviewed pertinent reports   , BMP/CMP:   Lab Results   Component Value Date    SODIUM 142 07/06/2021    K 4 3 07/06/2021     07/06/2021    CO2 28 07/06/2021    BUN 17 07/06/2021    CREATININE 1 07 07/06/2021    CALCIUM 8 8 07/06/2021    AST 16 07/06/2021    ALT 37 07/06/2021    ALKPHOS 100 07/06/2021    EGFR 96 07/06/2021    and CBC:   Lab Results   Component Value Date    WBC 7 25 07/06/2021    HGB 15 3 07/06/2021    HCT 46 6 07/06/2021    MCV 84 07/06/2021     07/06/2021    MCH 27 5 07/06/2021    MCHC 32 8 07/06/2021    RDW 12 8 07/06/2021    MPV 10 6 07/06/2021    NRBC 0 07/06/2021     Imaging/EKG Studies: Results: I have personally reviewed pertinent reports       XR Finger, R 3rd digit: No acute osseous abnormality  Other Studies:  No new    Code Status: Level 1 - Full Code  Advance Directive and Living Will:      Power of :    POLST:

## 2021-07-06 NOTE — ASSESSMENT & PLAN NOTE
- Dog bite of the proximal right long finger laterally and medially on 7/5    - evidence of flexor tendon sheath infection on exam  - IV Unasyn and Vanc ordered  - OR today for washout with Hand Surgery  - confirmed Tdap Updated on 7/5/21  - pain control

## 2021-07-06 NOTE — CONSULTS
Orthopedics   Rizwana Tiffany Brayden 23 y o  male MRN: [id number]  Unit/Bed#: FT 1      Chief Complaint:   right long finger pain    HPI:   26 y o male complaining of right long finger erythema and pain  Patient is left-hand dominant  Pt reports being bit by a dog yesterday in the long finger of right hand  He presented to the emergency department, received tetanus shot also prescription for Augmentin which he did not fill  He reports over the past day he has had worsening pain and swelling  The pain initially was around the proximal phalanx of the right long finger  Since and has begun to extend distally into the middle and distal phalanx as well as proximally into the palm  Pain is worse with attempted extension of the finger is unable to extend the long finger to any noticeable degree  He is able to slightly flex the IP and MCP joints of the long finger  He denies any numbness or tingling in the right hand  No fevers or chills    Review Of Systems:   · Skin:  See HPI  · Neuro: See HPI  · Musculoskeletal: See HPI  · 14 point review of systems negative except as stated above     Past Medical History:   History reviewed  No pertinent past medical history  Past Surgical History:   Past Surgical History:   Procedure Laterality Date    ANTERIOR CRUCIATE LIGAMENT REPAIR Left 2014       Family History:  Family history reviewed and non-contributory  Family History   Problem Relation Age of Onset    Stroke Family        Social History:  Social History     Socioeconomic History    Marital status: Single     Spouse name: None    Number of children: None    Years of education: None    Highest education level: None   Occupational History    None   Tobacco Use    Smoking status: Never Smoker    Smokeless tobacco: Never Used   Substance and Sexual Activity    Alcohol use:  Yes    Drug use: Never    Sexual activity: None   Other Topics Concern    None   Social History Narrative    No children    Hand dominance-Left     Social Determinants of Health     Financial Resource Strain:     Difficulty of Paying Living Expenses:    Food Insecurity:     Worried About Running Out of Food in the Last Year:     [address] in the Last Year:    Transportation Needs:     Lack of Transportation (Medical):  Lack of Transportation (Non-Medical):    Physical Activity:     Days of Exercise per Week:     Minutes of Exercise per Session:    Stress:     Feeling of Stress :    Social Connections:     Frequency of Communication with Friends and Family:     Frequency of Social Gatherings with Friends and Family:     Attends Gnosticism Services:     Active Member of Clubs or Organizations:     Attends Club or Organization Meetings:     Marital Status:    Intimate Partner Violence:     Fear of Current or Ex-Partner:     Emotionally Abused:     Physically Abused:     Sexually Abused:         Allergies:   No Known Allergies        Labs:  0   Lab Value Date/Time    HCT 46 6 07/06/2021 1213    HCT 45 4 07/05/2021 2035    HGB 15 3 07/06/2021 1213    HGB 15 1 07/05/2021 2035    INR 0 96 07/05/2021 2035    WBC 7 25 07/06/2021 1213    WBC 10 97 (H) 07/05/2021 2035       Meds:    Current Facility-Administered Medications:     ampicillin-sulbactam (UNASYN) 3 g in sodium chloride 0 9 % 100 mL IVPB, 3 g, Intravenous, Q6H, Lo Stafford PA-C    enoxaparin (LOVENOX) subcutaneous injection 30 mg, 30 mg, Subcutaneous, Q12H, Lo Stafford PA-C    HYDROmorphone (DILAUDID) injection 0 5 mg, 0 5 mg, Intravenous, Q1H PRN, Krys Mendoza PA-C    ibuprofen (MOTRIN) tablet 600 mg, 600 mg, Oral, Q6H PRN, Lo Stafford PA-C    multi-electrolyte (PLASMALYTE-A/ISOLYTE-S PH 7 4) IV solution, 125 mL/hr, Intravenous, Continuous, Lo Stafford PA-C    ondansetron (ZOFRAN) injection 4 mg, 4 mg, Intravenous, Q6H PRN, Lo Stafford PA-C    oxyCODONE (ROXICODONE) IR tablet 2 5 mg, 2 5 mg, Oral, Q4H PRN, Rhesa Abbot TRU Stafford    oxyCODONE (ROXICODONE) IR tablet 5 mg, 5 mg, Oral, Q4H PRN, Lo Stafford PA-C    vancomycin (VANCOCIN) 1,750 mg in sodium chloride 0 9 % 500 mL IVPB, 20 mg/kg, Intravenous, Once, Ca Francisco PA-C    Current Outpatient Medications:     amoxicillin-clavulanate (Augmentin) 875-125 mg per tablet, Take 1 tablet by mouth 2 (two) times a day for 7 days, Disp: 14 tablet, Rfl: 0    Blood Culture:   Lab Results   Component Value Date    BLOODCX Received in Microbiology Lab  Culture in Progress  07/05/2021    BLOODCX Received in Microbiology Lab  Culture in Progress  07/05/2021       Wound Culture:   No results found for: WOUNDCULT    Ins and Outs:  No intake/output data recorded  Physical Exam:   /83 (BP Location: Right arm)   Pulse 62   Temp 98 4 °F (36 9 °C) (Oral)   Resp 18   SpO2 98%   Gen: Alert and oriented to person, place, time  HEENT: EOMI, eyes clear, moist mucus membranes, hearing intact  Respiratory: Bilateral chest rise  No audible wheezing found  Cardiovascular: Regular Rate and Rhythm  Abdomen: soft nontender/nondistended  · Musculoskeletal: right Upper Extremity - long finger  · Skin:  Couple of small puncture wounds on the radial and ulnar aspects of the proximal phalanx  Erythema around the entire proximal phalanx around the volar aspect of the middle and distal phalanx     · Tenderness palpation along the flexor tendon sheath extending into the palm about 1-2 cm proximal to the MCP joint  · At rest the long finger is in flexion in the MCP, PIP, PIP joints, patient is in able to actively extend the  finger secondary to pain swelling, severe pain with passive extension of the IP/MP joints, there is diffuse swelling around the proximal phalanx but not fusiform swelling around the distal or middle phalanx  · Sensation is intact to light touch along the radial and ulnar aspects of the digit  · FDP and FDS are intact  · Brisk capillary refill in the fingertip    Radiology:   I personally reviewed the films    X-rays labeled in epic of left long finger appeared imaging to be the right hand this demonstrates soft tissue swelling volar aspect of the proximal phalanx    _*_*_*_*_*_*_*_*_*_*_*_*_*_*_*_*_*_*_*_*_*_*_*_*_*_*_*_*_*_*_*_*_*_*_*_*_*_*_*_*_*    Assessment:  26 y o male status post dog bite to right long finger, signs and symptoms concerning for flexor tenosynovitis    Plan:     · Nonweight bearing to right upper extremity  · NPO  · Preop labs performed in ED  · Case has been [facility] GAN [facility] Hand [facility] - plan is for operative I and D of the right long finger later today  · Informed consent will be obtained  · Dispo: Ortho will follow      Tanya Quiroz PA-C

## 2021-07-07 PROCEDURE — 99231 SBSQ HOSP IP/OBS SF/LOW 25: CPT | Performed by: SURGERY

## 2021-07-07 PROCEDURE — 99024 POSTOP FOLLOW-UP VISIT: CPT | Performed by: PHYSICIAN ASSISTANT

## 2021-07-07 RX ADMIN — ACETAMINOPHEN 975 MG: 325 TABLET, FILM COATED ORAL at 05:32

## 2021-07-07 RX ADMIN — OXYCODONE HYDROCHLORIDE 5 MG: 5 TABLET ORAL at 16:41

## 2021-07-07 RX ADMIN — OXYCODONE HYDROCHLORIDE 5 MG: 5 TABLET ORAL at 20:42

## 2021-07-07 RX ADMIN — SODIUM CHLORIDE 3 G: 9 INJECTION, SOLUTION INTRAVENOUS at 01:28

## 2021-07-07 RX ADMIN — ACETAMINOPHEN 975 MG: 325 TABLET, FILM COATED ORAL at 14:41

## 2021-07-07 RX ADMIN — SODIUM CHLORIDE 3 G: 9 INJECTION, SOLUTION INTRAVENOUS at 09:56

## 2021-07-07 RX ADMIN — SODIUM CHLORIDE, SODIUM GLUCONATE, SODIUM ACETATE, POTASSIUM CHLORIDE, MAGNESIUM CHLORIDE, SODIUM PHOSPHATE, DIBASIC, AND POTASSIUM PHOSPHATE 125 ML/HR: .53; .5; .37; .037; .03; .012; .00082 INJECTION, SOLUTION INTRAVENOUS at 03:58

## 2021-07-07 RX ADMIN — OXYCODONE HYDROCHLORIDE 5 MG: 5 TABLET ORAL at 06:05

## 2021-07-07 RX ADMIN — SODIUM CHLORIDE 3 G: 9 INJECTION, SOLUTION INTRAVENOUS at 20:31

## 2021-07-07 RX ADMIN — SODIUM CHLORIDE 3 G: 9 INJECTION, SOLUTION INTRAVENOUS at 14:41

## 2021-07-07 RX ADMIN — HYDROMORPHONE HYDROCHLORIDE 0.5 MG: 1 INJECTION, SOLUTION INTRAMUSCULAR; INTRAVENOUS; SUBCUTANEOUS at 10:05

## 2021-07-07 RX ADMIN — OXYCODONE HYDROCHLORIDE 5 MG: 5 TABLET ORAL at 12:13

## 2021-07-07 NOTE — PROGRESS NOTES
Progress Note - Orthopedics   Thomas Iniguezdes 23 y o  male MRN: [id number]  Unit/Bed#: S -01      Subjective:    26 y o male seen and evaluated  He reports pain in the hand that is controlled on current pain regimen  No fevers or chills  Labs:  0   Lab Value Date/Time    HCT 46 6 07/06/2021 1213    HCT 45 4 07/05/2021 2035    HGB 15 3 07/06/2021 1213    HGB 15 1 07/05/2021 2035    INR 0 96 07/05/2021 2035    WBC 7 25 07/06/2021 1213    WBC 10 97 (H) 07/05/2021 2035       Meds:    Current Facility-Administered Medications:     acetaminophen (TYLENOL) tablet 975 mg, 975 mg, Oral, Q8H [address], Darlene Peterson PA-C, 975 mg at 07/07/21 0532    ampicillin-sulbactam (UNASYN) 3 g in sodium chloride 0 9 % 100 mL IVPB, 3 g, Intravenous, Q6H, Tong Johnson PA-C, Last Rate: 200 mL/hr at 07/07/21 0128, 3 g at 07/07/21 0128    enoxaparin (LOVENOX) subcutaneous injection 30 mg, 30 mg, Subcutaneous, Q12H, Tong Johnson PA-C    HYDROmorphone (DILAUDID) injection 0 5 mg, 0 5 mg, Intravenous, Q1H PRN, Tong Johnson PA-C    ibuprofen (MOTRIN) tablet 600 mg, 600 mg, Oral, Q6H PRN, Tong Johnson PA-C, 600 mg at 07/06/21 2054    melatonin tablet 3 mg, 3 mg, Oral, HS, Darlene Peterson PA-C, 3 mg at 07/06/21 2101    ondansetron (ZOFRAN) injection 4 mg, 4 mg, Intravenous, Q6H PRN, Tong Johnson PA-C    oxyCODONE (ROXICODONE) IR tablet 2 5 mg, 2 5 mg, Oral, Q4H PRN, Tong Johnson PA-C    oxyCODONE (ROXICODONE) IR tablet 5 mg, 5 mg, Oral, Q4H PRN, Tong Johnson PA-C, 5 mg at 07/07/21 0605    Blood Culture:   Lab Results   Component Value Date    BLOODCX No Growth at 24 hrs  07/05/2021    BLOODCX No Growth at 24 hrs  07/05/2021       Wound Culture:   No results found for: WOUNDCULT    Ins and Outs:  I/O last 24 hours:   In: 2214 6 [I V :1635 4; IV Piggyback:579 2]  Out: -           Physical:  Vitals:    07/07/21 0713   BP: 119/68   Pulse: 60   Resp: 16   Temp: 97 8 °F (36 6 °C)   SpO2: 97%     Musculoskeletal: right Upper Extremity  · Skin edematous within the hole and long finger no erythema, no ecchymosis  · Dressing with small amount of bloody drainage  · Packing was pulled today  · TTP at incision sites and globally at the long finger extending into the mid palm  · SILT m/r/u  Motor intact ain/pin/m/r/u, 2+ radial pulse      Assessment:    26 y o male postop day 1 status post right long finger I&D for flexor tenosynovitis status post dog bite    Plan:  · Dressing changed today, packing pulled  · Begin TID warm soapy water soaks, 10 min at a time  Dry dressings in between  NO neosporin or peroxide please  · IV abx per primary team - unasyn  · Cultures - in process, will follow  · Pain control  · Dispo: Ortho will follow, plan for re-evaluation tomorrow    Discharge planning if improved, will make NPO at midnight for possible repeat I&D tomorrow if needed    Easton Castillo PA-C

## 2021-07-07 NOTE — UTILIZATION REVIEW
Initial Clinical Review  OBSERVATION 7/6/21 @1341 CONVERTED TO INPATIENT ADMISSION 7/6/21 @1719 DUE TO CONTINUED STAY REQUIRED TO EVALUATE AND TREAT PATIENT WITH FLEXOR TENOSYNOVITIS S/P DOG BITE  WITH IV ABX AND POSSSIBLY REPEAT I AND D  Admission: Date/Time/Statement:   Admission Orders (From admission, onward)     Ordered        07/06/21 1719  Inpatient Admission  Once         07/06/21 1341  Place in Observation  Once                   Orders Placed This Encounter   Procedures    Inpatient Admission     Standing Status:   Standing     Number of Occurrences:   1     Order Specific Question:   Level of Care     Answer:   Med Surg [16]     Order Specific Question:   Bed Type     Answer:   Trauma [7]     Order Specific Question:   Estimated length of stay     Answer:   More than 2 Midnights     Order Specific Question:   Certification     Answer:   I certify that inpatient services are medically necessary for this patient for a duration of greater than two midnights  See H&P and MD Progress Notes for additional information about the patient's course of treatment  ED Arrival Information     Expected Arrival Acuity    - 7/6/2021 10:37 Urgent         Means of arrival Escorted by Service Admission type    Walk-In Family Member Trauma Urgent         Arrival complaint    wound check        Chief Complaint   Patient presents with    Wound Check     pt got bit by a dog on his R hand, was told to come back in for re-eval and to  abx       Initial Presentation: 23 y o  male who presents to ED from hiome status post dog bite to the middle finger which occurred yesterday   Pt was seen in ED yesterday and given script for po Augmentin  Pt unable to fill script, had much pain in hand, unable to sleep with increased swelling  today and pain extending down into the joint and his hand  This prompted him to return to the emergency department today   On exam,   R long finger erythematous and edematous, proximally at the PIP joint, extending proximally through the phalanx to the MCP joint  Unable to flex or extend the finger, + Kanavel sign  Consistent with medial nerve distribution as well  Pt has small puncture wound laterally and even smaller puncture wound medially over the proximal phalanx of the long finger on the right  ensation intact distally to light tough in the long finger, and capillary refill < 2 seconds in the fingertip  Evidence of flexor tendon sheath infection on exam    XR R middle finger shows nothing acute   Pt given IV analgesic, IVF , dual IV abx in ED  Pt admitted as OBS by trauma with dog bite of the R hand   Plan- IV abx-Unasyn and Vanc , consult to orthopedic hand surgery , pain control  Plan forr OR today for washout   Orthopedics-NPO, Nonweight bearing to right upper extremity  operative I and D of the right long finger later today    Flexor tenosynovitis of finger s/p dog bite  [email or web address]   DEBRIDEMENT HAND/FINGER Justo SEXTON) (Right)  Anesthesia Type:   Conscious Sedation   Operative Findings:   murky blood tinged fluid within the flexor tendon sheath     Trauma -post op-Right hand surgical dressing in place with brisk capillary refill x 5 digits  Right pointer finger sensation to light and sharp touch decreased with sensation to pressure and motor intact  Will continue IVabx, NWB RUE, multimodal pain control  Pt upgraded to inpatient from OBS post operatively    Date: 7/7  Day 2:POD #1  Trauma-right long finger has better motor range and sensation has improved  Right hand surgical dressing in place without strike through, tender to palp over middle finger   Pt reports increased pain this am after slept thru night dose of pain med  Pt receiving IV and po prn narcotic analgesic for pain  Plan - continue IV abx per ortho recommendation, d/c IVF, start diet    Ortho-R middle finger-Skin edematous within the hole and long finger no erythema, no ecchymosis    Dressing with small amount of bloody drainage  Packing was pulled today, will begin TID warm soapy water soaks, 10 min at a time  Dry dressings in between  NO neosporin or peroxide   Will f/u wound cultures, will make NPO after MN tonight for possible repeat I and D tomorrow  ED Triage Vitals   Temperature Pulse Respirations Blood Pressure SpO2   07/06/21 1047 07/06/21 1047 07/06/21 1047 07/06/21 1047 07/06/21 1047   98 4 °F (36 9 °C) 74 16 135/90 98 %      Temp Source Heart Rate Source Patient Position - Orthostatic VS BP Location FiO2 (%)   07/06/21 1047 07/06/21 1047 07/06/21 1308 07/06/21 1308 --   Oral Monitor Lying Right arm       Pain Score       07/06/21 1047       Worst Possible Pain          Wt Readings from Last 1 Encounters:   07/06/21 81 6 kg (180 lb)     Additional Vital Signs:   Date/Time  Temp  Pulse  Resp  BP  MAP (mmHg)  SpO2    07/07/21 15:15:41  98 2 °F (36 8 °C)  64  16  122/68  86  94 %    07/07/21 11:31:32  98 °F (36 7 °C)  54Abnormal   16  117/69  85  97 %    07/07/21 07:13:46  97 8 °F (36 6 °C)  60  16  119/68  85  97 %    07/07/21 04:05:39  97 4 °F (36 3 °C)Abnormal   60  --  101/64  76  96 %    07/07/21 00:07:41  98 6 °F (37 °C)  80  --  120/66  84  96 %    07/06/21 2100  98 5 °F (36 9 °C)  65  18  146/53  --  95 %    07/06/21 2000  98 °F (36 7 °C)  60  18  140/64  --  97 %    07/06/21 19:25:33  98 5 °F (36 9 °C)  57  16  133/83  100  98 %    07/06/21 1855  97 8 °F (36 6 °C)  64  18  130/86  105  98 %    07/06/21 1844  97 °F (36 1 °C)Abnormal   60  14  140/93  --  100 %    07/06/21 1830  --  50Abnormal   11Abnormal   106/59  --  100 %    07/06/21 1816  96 8 °F (36 °C)Abnormal   54Abnormal   18  107/59  --  100 %    07/06/21 1702  --  69  18  141/87  --  100 %    07/06/21 1620  --  78  16  122/78  --  100 %        Pertinent Labs/Diagnostic Test Results:   7/5 XR R finger-3rd/ middle finger- No acute osseous abnormality          Results from last 7 days   Lab Units 07/06/21  1557 07/06/21  1213 07/05/21  2035   WBC Thousand/uL  --  7 25 10 97*   HEMOGLOBIN g/dL  --  15 3 15 1   HEMATOCRIT %  --  46 6 45 4   PLATELETS Thousands/uL 190 201 240   NEUTROS ABS Thousands/µL  --  5 04 7 26         Results from last 7 days   Lab Units 07/06/21  1213 07/05/21 2035   SODIUM mmol/L 142 140   POTASSIUM mmol/L 4 3 4 1   CHLORIDE mmol/L 107 103   CO2 mmol/L 28 27   ANION GAP mmol/L 7 10   BUN mg/dL 17 20   CREATININE mg/dL 1 07 1 22   EGFR ml/min/1 73sq m 96 82   CALCIUM mg/dL 8 8 8 9     Results from last 7 days   Lab Units 07/06/21  1213 07/05/21 2035   AST U/L 16 24   ALT U/L 37 41   ALK PHOS U/L 100 94   TOTAL PROTEIN g/dL 7 5 7 4   ALBUMIN g/dL 4 3 4 3   TOTAL BILIRUBIN mg/dL 0 57 0 48         Results from last 7 days   Lab Units 07/06/21  1213 07/05/21 2035   GLUCOSE RANDOM mg/dL 100 98           Results from last 7 days   Lab Units 07/05/21 2035   PROTIME seconds 12 9   INR  0 96   PTT seconds 27             Results from last 7 days   Lab Units 07/05/21 2035   LACTIC ACID mmol/L 1 0               Results from last 7 days   Lab Units 07/06/21  1817 07/05/21 2035   BLOOD CULTURE   --  No Growth at 24 hrs  No Growth at 24 hrs  GRAM STAIN RESULT  1+ Polys  No bacteria seen  --                ED Treatment:   Medication Administration from 07/06/2021 1037 to 07/06/2021 1657       Date/Time Order Dose Route Action     07/06/2021 1214 ketorolac (TORADOL) injection 15 mg 15 mg Intravenous Given     07/06/2021 1321 ampicillin-sulbactam (UNASYN) 3 g in sodium chloride 0 9 % 100 mL IVPB 3 g Intravenous New Bag     07/06/2021 1459 vancomycin (VANCOCIN) 1,750 mg in sodium chloride 0 9 % 500 mL IVPB 1,750 mg Intravenous New Bag     07/06/2021 1555 multi-electrolyte (PLASMALYTE-A/ISOLYTE-S PH 7 4) IV solution 125 mL/hr Intravenous New Bag        History reviewed  No pertinent past medical history    Present on Admission:  **None**      Admitting Diagnosis: Flexor tenosynovitis of finger [M65 9]  Wound check, abscess [Z51 89]  Dog bite of finger, subsequent encounter [S61 259D, W54  0XXD]  Age/Sex: 23 y o  male  Admission Orders:  Scheduled Medications:  acetaminophen, 975 mg, Oral, Q8H [address]  ampicillin-sulbactam, 3 g, Intravenous, Q6H  enoxaparin, 30 mg, Subcutaneous, Q12H  melatonin, 3 mg, Oral, HS      Continuous IV Infusions:multi-electrolyte (PLASMALYTE-A/ISOLYTE-S PH 7 4) IV solution   Rate: 125 mL/hr Dose: 125 mL/hr  Freq: Continuous Route: IV  Last Dose: Stopped (07/07/21 0900)     PRN Meds:  HYDROmorphone, 0 5 mg, Intravenous, Q1H PRN x1 7/7  ibuprofen, 600 mg, Oral, Q6H PRN x1 7/6  ondansetron, 4 mg, Intravenous, Q6H PRN  oxyCODONE, 2 5 mg, Oral, Q4H PRN  oxyCODONE, 5 mg, Oral, Q4H PRN x2 7/6, x2 7/7    Warm soapy water soaks TID, 10 min at a time  Dry dressings in between, no neosporin  Face mask O2  OOB to chair TID  SCD's  NPO effective 7/8 @0001, reg diet prior to that time    IP CONSULT TO CASE MANAGEMENT  IP CONSULT TO HAND SURGERY    Network Utilization Review Department  ATTENTION: Please call with any questions or concerns to [phone] and carefully listen to the prompts so that you are directed to the right person  All voicemails are confidential   Vanita Cohen all requests for admission clinical reviews, approved or denied determinations and any other requests to dedicated fax number below belonging to the campus where the patient is receiving treatment   List of dedicated fax numbers for the Facilities:  [address] [place] [address] DENIALS (Administrative/Medical Necessity) [phone]   [address] [place] [address] (Maternity/NICU/Pediatrics) [address] [facility],[address] Hernandez Wilson [phone] [address]  [address] [facility] Kayla Cotter 14[phone] [place] [address] [phone]

## 2021-07-07 NOTE — PLAN OF CARE
Problem: PAIN - ADULT  Goal: Verbalizes/displays adequate comfort level or baseline comfort level  Description: Interventions:  - Encourage patient to monitor pain and request assistance  - Assess pain using appropriate pain scale  - Administer analgesics based on type and severity of pain and evaluate response  - Implement non-pharmacological measures as appropriate and evaluate response  - Consider cultural and social influences on pain and pain management  - Notify physician/advanced practitioner if interventions unsuccessful or patient reports new pain  Outcome: Not Progressing     Problem: INFECTION - ADULT  Goal: Absence or prevention of progression during hospitalization  Description: INTERVENTIONS:  - Assess and monitor for signs and symptoms of infection  - Monitor lab/diagnostic results  - Monitor all insertion sites, i e  indwelling lines, tubes, and drains  - Monitor endotracheal if appropriate and nasal secretions for changes in amount and color  - [place] appropriate cooling/warming therapies per order  - Administer medications as ordered  - Instruct and encourage patient and family to use good hand hygiene technique  - Identify and instruct in appropriate isolation precautions for identified infection/condition  Outcome: Not Progressing  Goal: Absence of fever/infection during neutropenic period  Description: INTERVENTIONS:  - Monitor WBC    Outcome: Not Progressing     Problem: SAFETY ADULT  Goal: Patient will remain free of falls  Description: INTERVENTIONS:  - Educate patient/family on patient safety including physical limitations  - Instruct patient to call for assistance with activity   - Consult OT/PT to assist with strengthening/mobility   - Keep Call bell within reach  - Keep bed low and locked with side rails adjusted as appropriate  - Keep care items and personal belongings within reach  - Initiate and maintain comfort rounds  - Make Fall Risk Sign visible to staff  - Apply yellow socks and bracelet for high fall risk patients  - Consider moving patient to room near nurses station  Outcome: Not Progressing  Goal: Maintain or return to baseline ADL function  Description: INTERVENTIONS:  -  Assess patient's ability to carry out ADLs; assess patient's baseline for ADL function and identify physical deficits which impact ability to perform ADLs (bathing, care of mouth/teeth, toileting, grooming, dressing, etc )  - Assess/evaluate cause of self-care deficits   - Assess range of motion  - Assess patient's mobility; develop plan if impaired  - Assess patient's need for assistive devices and provide as appropriate  - Encourage maximum independence but intervene and supervise when necessary  - Involve family in performance of ADLs  - Assess for home care needs following discharge   - Consider OT consult to assist with ADL evaluation and planning for discharge  - Provide patient education as appropriate  Outcome: Not Progressing  Goal: Maintains/Returns to pre admission functional level  Description: INTERVENTIONS:  - Perform BMAT or MOVE assessment daily    - Set and communicate daily mobility goal to care team and patient/family/caregiver     - Collaborate with rehabilitation services on mobility goals if consulted  - Record patient progress and toleration of activity level   Outcome: Not Progressing     Problem: DISCHARGE PLANNING  Goal: Discharge to home or other facility with appropriate resources  Description: INTERVENTIONS:  - Identify barriers to discharge w/patient and caregiver  - Arrange for needed discharge resources and transportation as appropriate  - Identify discharge learning needs (meds, wound care, etc )  - Arrange for interpretive services to assist at discharge as needed  - Refer to Case Management Department for coordinating discharge planning if the patient needs post-hospital services based on physician/advanced practitioner order or complex needs related to functional status, cognitive ability, or social support system  Outcome: Not Progressing     Problem: Knowledge Deficit  Goal: Patient/family/caregiver demonstrates understanding of disease process, treatment plan, medications, and discharge instructions  Description: Complete learning assessment and assess knowledge base    Interventions:  - Provide teaching at level of understanding  - Provide teaching via preferred learning methods  Outcome: Not Progressing

## 2021-07-07 NOTE — PROGRESS NOTES
[facility]  Progress Note Chacha Lexy Panagopoulos 1995, 23 y o  male MRN: [id number]  Unit/Bed#: S -01 Encounter: [id number]  Primary Care Provider: No primary care provider on file  Date and time admitted to hospital: 7/6/2021 10:46 AM    Dog bite of finger  Assessment & Plan  - Dog bite of the proximal right long finger laterally and medially on 7/5    - evidence of flexor tendon sheath infection on exam  - IV Unasyn and Vanc ordered  - S/P OR I&D on 07/06 with Dr Mendoza Kwong  - confirmed Tdap Updated on 7/5/21  - pain control    * Flexor tenosynovitis of finger  Assessment & Plan  - Continue IV Unasyn  - Vancomycin x 1 dose given with itching - now stopped  - Orthopedics on board        TERTIARY TRAUMA SURVEY NOTE    Prophylaxis: Enoxaparin (Lovenox)    Disposition: Home pending full IV abx course    Code status:  Level 1 - Full Code    Consultants: Orthopedics    Is the patient 65 years or older?: No          SUBJECTIVE:     Transfer from: home  Outside Films Received: not applicable  Tertiary Exam Due on: 7/7/21    Mechanism of Injury:Other: dog bite    Chief Complaint: Pain in right hand     HPI/Last 24 hour events:  Patient reports no events overnight, he slept well however missed last oxycodone dose and is having increased pain this morning    Numbness in his right pointer finger has improved to almost normal     Active medications:           Current Facility-Administered Medications:     acetaminophen (TYLENOL) tablet 975 mg, 975 mg, Oral, Q8H [place], 975 mg at 07/07/21 0532    ampicillin-sulbactam (UNASYN) 3 g in sodium chloride 0 9 % 100 mL IVPB, 3 g, Intravenous, Q6H, 3 g at 07/07/21 0128    enoxaparin (LOVENOX) subcutaneous injection 30 mg, 30 mg, Subcutaneous, Q12H    HYDROmorphone (DILAUDID) injection 0 5 mg, 0 5 mg, Intravenous, Q1H PRN    ibuprofen (MOTRIN) tablet 600 mg, 600 mg, Oral, Q6H PRN, 600 mg at 07/06/21 2054    melatonin tablet 3 mg, 3 mg, Oral, HS, 3 mg at 07/06/21 2101    multi-electrolyte (PLASMALYTE-A/ISOLYTE-S PH 7 4) IV solution, 125 mL/hr, Intravenous, Continuous, 125 mL/hr at 07/07/21 0358    ondansetron (ZOFRAN) injection 4 mg, 4 mg, Intravenous, Q6H PRN    oxyCODONE (ROXICODONE) IR tablet 2 5 mg, 2 5 mg, Oral, Q4H PRN    oxyCODONE (ROXICODONE) IR tablet 5 mg, 5 mg, Oral, Q4H PRN, 5 mg at 07/07/21 0605      OBJECTIVE:     Vitals:   Vitals:    07/07/21 0405   BP: 101/64   Pulse: 60   Resp:    Temp: (!) 97 4 °F (36 3 °C)   SpO2: 96%       Physical Exam:   GENERAL APPEARANCE:  No acute distress resting comfortably supine in bed  NEURO:  AAO x3, GCS 15, sensation motor intact x5 digits in the right hand  HEENT:  PERRL, EOMI, mucous membranes moist  CV:  RRR, S1, S2 without murmur rub or gallop  LUNGS:  Clear to auscultation bilaterally without wheezes rales or rhonchi  GI:  Soft, nontender, nondistended  :  Voiding independently  MSK:  Right hand surgical dressing in place without strike through, tender to palp over middle finger  SKIN:  Warm dry intact, surgical dressing in place over right hand    I/O:   I/O       07/05 0701 - 07/06 0700 07/06 0701 - 07/07 0700    I V  (mL/kg)  1635 4 (20)    IV Piggyback  579 2    Total Intake(mL/kg)  2214 6 (27 1)    Net  +2214 6                Invasive Devices: Invasive Devices     Peripheral Intravenous Line            Peripheral IV 07/06/21 Left Antecubital <1 day                  Imaging:   No results found      Labs:   CBC:   Lab Results   Component Value Date    WBC 7 25 07/06/2021    HGB 15 3 07/06/2021    HCT 46 6 07/06/2021    MCV 84 07/06/2021     07/06/2021    MCH 27 5 07/06/2021    MCHC 32 8 07/06/2021    RDW 12 8 07/06/2021    MPV 10 6 07/06/2021    NRBC 0 07/06/2021     CMP:   Lab Results   Component Value Date     07/06/2021    CO2 28 07/06/2021    BUN 17 07/06/2021    CREATININE 1 07 07/06/2021    CALCIUM 8 8 07/06/2021    AST 16 07/06/2021    ALT 37 07/06/2021    ALKPHOS 100 07/06/2021 EGFR 96 07/06/2021

## 2021-07-07 NOTE — QUICK NOTE
Acute Care Surgery   Post-Op Check Progress Note   Yulia Martinez 23 y o  male MRN: [id number]  Unit/Bed#: S -01 Encounter: [id number]    Assessment and Plan:    21-year-old male with Right long finger flexor tenosynovitis status post Incision and drainage right long finger    - P R N  Analgesia  -Continue IV abx overnight   -NWB RUE   -Multimodal analgesia   - Encouraged incentive spirometry use  Subjective/Objective     Subjective: Reports pain in right hand starting to worsen  He has no sensation in his right pointer finger which is new since the surgery  He denies other complaints, reports itching has improved  Objective:     Blood pressure 133/83, pulse 57, temperature 98 5 °F (36 9 °C), resp  rate 16, height 5' 7" (1 702 m), weight 81 6 kg (180 lb), SpO2 98 %  ,Body mass index is 28 19 kg/m²  Intake/Output Summary (Last 24 hours) at 7/6/2021 2053  Last data filed at 7/6/2021 1806  Gross per 24 hour   Intake 929 17 ml   Output --   Net 929 17 ml       Invasive Devices     Peripheral Intravenous Line            Peripheral IV 07/06/21 Left Antecubital <1 day                Physical Exam:    GENERAL APPEARANCE: Patient in no acute distress  HEENT: NCAT; PERRL, EOMs intact; Mucous membranes moist  CV: Regular rate and rhythm; + S1, S2; no murmur/gallops/rubs appreciated  LUNGS: Clear to auscultation; no wheezes/rales/rhonci  ABD: NABS; soft; non-distended; non-tender  EXT: +2 pulses bilaterally upper & lower extremities; no clubbing/cyanosis/edema  Right hand surgical dressing in place with brisk capillary refill x 5 digits  Right pointer finger sensation to light and sharp touch decreased with sensation to pressure and motor intact  NEURO: GCS 15; no focal neurologic deficits; neurovascularly intact  SKIN: Warm, dry and well perfused; no rash; no jaundice                  Pratt Bullocks, PAEmilieC  7/6/2021

## 2021-07-07 NOTE — OP NOTE
OPERATIVE REPORT  PATIENT NAME: Cecil Olivarez  :  1995  MRN: [id number]  Pt Location: [facility]    SURGERY DATE: 21    Surgeon(s) and Role:     * Rosezella Curling, MD - Primary    Pre-Op Diagnosis:  Flexor tenosynovitis of finger [M65 9]    Post-Op Diagnosis Codes:     * Flexor tenosynovitis of finger [M65 9]    Procedure(s):  DEBRIDEMENT HAND/FINGER ([address]) (Right)    Specimen(s):  Order Name Source Comment Collection Info Order Time   ANAEROBIC CULTURE AND GRAM STAIN Finger, Right  Collected By: Rosezella Curling, MD 2021  6:18 PM     Release to patient through [address]   Immediate        WOUND CULTURE Finger, Right  Collected By: Rosezella Curling, MD 2021  6:18 PM     Release to patient through Mychart   Immediate            Estimated Blood Loss:   Minimal    Anesthesia Type:   Conscious Sedation     IMPLANTS:  * No implants in log *    PERIOPERATIVE ANTIBIOTICS:    cefazolin, 2 grams    Tourniquet Time: 16 min at 250 mmHg          Operative Indications: The patient has a history of  Right long finger dog bite approximately 24 hours prior to presentation with development of inter pole flexor tenosynovitis that was recalcitrant to conservative management  The decision was made to bring the patient to the operating room for  Right long finger irrigation debridement  Risks of the procedure were explained which include, but are not limited to bleeding; infection; damage to nerves, arteries,veins, tendons; scar; pain; need for reoperation; failure to give desired result; and risks of anaesthesia  All questions were answered to satisfaction and they were willing to proceed  Operative Findings:   murky blood tinged fluid within the flexor tendon sheath    Complications:   None    Procedure and Technique:  After the patient, site, and procedure were identified, the patient was brought into the operating room in a supine position  Local anaesthesia and sedation were provided  A well padded tourniquet was applied to the extremity, set at 250 mmHg  The  left upper extremity was then prepped and drapped in a normal, sterile, orthopedic fashion  A  longitudinal  incision is made in line with the right long  overlying the A1 pulley    Dissection was  carried down under loupe magnification  Skin and subcutaneous tissues were sharply incised  The tissue was elevated off the A1 pulley  The A1 pulley was  identified and incised  Murky fluid was noted within the flexor tendon sheath  A culture was obtained both aerobic and anaerobic  5 mm horixontal incisions were made overlying the DIP and PIP  Joints volarly  A small longitudanal incision was made in the A5 and A3 pulleys respectively   A 14 gauge angiocatheter was passed in a retrograde fashion from the incised A1 pulley  200 cc of sterile saline were utilized to irrigate the flexor tendon sheath from the A1 pulley to theA1&  A5 rent  the puncture wound was bluntly dissected which was found to communicate with the flexor tendon sheath this is to irrigated in a small dorsal counter incision was made overlying the PIP joint dorsally which yielded no further purulent fluid  the incision overlying the A1 pulley was packed  With iodoform gauze  At the completion of the procedure, hemostasis was obtained with cautery and direct pressure  The wounds were copiously irrigated with sterile solution  The wounds were closed with Prolene  Sterile dressings were applied, including Xeroform, gauze, tweeners, webril, ACE  Please note, all sponge, needle, and instrument counts were correct prior to closure  Loupe magnification was utilized  The patient tolerated the procedure well       I was present for the entire procedure    Patient Disposition:  PACU     SIGNATURE: Cheryl Penny MD  DATE: 07/07/21  TIME: 1:29 PM

## 2021-07-07 NOTE — ASSESSMENT & PLAN NOTE
- Dog bite of the proximal right long finger laterally and medially on 7/5    - evidence of flexor tendon sheath infection on exam  - IV Unasyn and Vanc ordered  - S/P OR I&D on 07/06 with Dr Donita Gonzalez  - confirmed Tdap Updated on 7/5/21  - pain control

## 2021-07-07 NOTE — ASSESSMENT & PLAN NOTE
- Continue IV Unasyn  - Vancomycin x 1 dose given with itching - now stopped  - Orthopedics on board

## 2021-07-07 NOTE — UTILIZATION REVIEW
Inpatient Admission Authorization Request   NOTIFICATION OF INPATIENT ADMISSION/INPATIENT AUTHORIZATION REQUEST   SERVICING FACILITY:   [address]  Tax ID: [id number]  NPI: [id number]  Place of Service: Inpatient [address] [facility]  [address]  Place of Service Code: 24     ATTENDING PROVIDER:  Attending Name and NPI#: Ya Liz Md [[id number]]  Address: [address]  Phone: [phone]     UTILIZATION REVIEW CONTACT:  [facility] Utilization   Network Utilization Review Department  Phone: [phone]  Fax: [phone]  Email: [email or web address]     PHYSICIAN ADVISORY SERVICES:  FOR [id number] REVIEW - MEDICAL NECESSITY DENIAL  Phone: [phone]  Fax: [phone]  Email: [email or web address]     TYPE OF REQUEST:  Inpatient Status     ADMISSION INFORMATION:  ADMISSION DATE/TIME: 7/6/21  5:19 PM  PATIENT DIAGNOSIS CODE/DESCRIPTION:  Flexor tenosynovitis of finger [M65 9]  Wound check, abscess [Z51 89]  Dog bite of finger, subsequent encounter [S61 259D, W54  0XXD]  DISCHARGE DATE/TIME: No discharge date for patient encounter  DISCHARGE DISPOSITION (IF DISCHARGED): Home/Self Care     IMPORTANT INFORMATION:  Please contact the [facility] directly with any questions or concerns regarding this request  Department voicemails are confidential     Send requests for admission clinical reviews, concurrent reviews, approvals, and administrative denials due to lack of clinical to fax [phone]

## 2021-07-08 ENCOUNTER — TELEPHONE (OUTPATIENT)
Dept: OBGYN CLINIC | Facility: HOSPITAL | Age: 26
End: 2021-07-08

## 2021-07-08 VITALS
HEART RATE: 91 BPM | TEMPERATURE: 97.7 F | DIASTOLIC BLOOD PRESSURE: 79 MMHG | BODY MASS INDEX: 28.25 KG/M2 | OXYGEN SATURATION: 95 % | WEIGHT: 180 LBS | HEIGHT: 67 IN | RESPIRATION RATE: 16 BRPM | SYSTOLIC BLOOD PRESSURE: 122 MMHG

## 2021-07-08 PROCEDURE — 99024 POSTOP FOLLOW-UP VISIT: CPT | Performed by: PHYSICIAN ASSISTANT

## 2021-07-08 PROCEDURE — 99238 HOSP IP/OBS DSCHRG MGMT 30/<: CPT | Performed by: SURGERY

## 2021-07-08 PROCEDURE — NC001 PR NO CHARGE: Performed by: SURGERY

## 2021-07-08 RX ORDER — ACETAMINOPHEN 325 MG/1
650 TABLET ORAL EVERY 4 HOURS PRN
Refills: 0
Start: 2021-07-08

## 2021-07-08 RX ORDER — IBUPROFEN 600 MG/1
600 TABLET ORAL EVERY 6 HOURS PRN
Qty: 30 TABLET | Refills: 0 | Status: SHIPPED | OUTPATIENT
Start: 2021-07-08

## 2021-07-08 RX ORDER — OXYCODONE HYDROCHLORIDE 5 MG/1
2.5-5 TABLET ORAL EVERY 4 HOURS PRN
Qty: 20 TABLET | Refills: 0 | Status: SHIPPED | OUTPATIENT
Start: 2021-07-08

## 2021-07-08 RX ORDER — AMOXICILLIN AND CLAVULANATE POTASSIUM 875; 125 MG/1; MG/1
1 TABLET, FILM COATED ORAL 2 TIMES DAILY
Qty: 14 TABLET | Refills: 0 | Status: SHIPPED | OUTPATIENT
Start: 2021-07-08 | End: 2021-07-15

## 2021-07-08 RX ADMIN — ACETAMINOPHEN 975 MG: 325 TABLET, FILM COATED ORAL at 00:56

## 2021-07-08 RX ADMIN — OXYCODONE HYDROCHLORIDE 5 MG: 5 TABLET ORAL at 09:09

## 2021-07-08 RX ADMIN — Medication 3 MG: at 00:56

## 2021-07-08 RX ADMIN — ACETAMINOPHEN 975 MG: 325 TABLET, FILM COATED ORAL at 08:57

## 2021-07-08 RX ADMIN — SODIUM CHLORIDE 3 G: 9 INJECTION, SOLUTION INTRAVENOUS at 08:57

## 2021-07-08 RX ADMIN — OXYCODONE HYDROCHLORIDE 5 MG: 5 TABLET ORAL at 00:56

## 2021-07-08 RX ADMIN — SODIUM CHLORIDE 3 G: 9 INJECTION, SOLUTION INTRAVENOUS at 02:39

## 2021-07-08 NOTE — DISCHARGE SUMMARY
[facility]  Discharge- Cathleen Owen 1995, 23 y o  male MRN: [id number]  Unit/Bed#: S -01 Encounter: [id number]  Primary Care Provider: No primary care provider on file  Date and time admitted to hospital: 7/6/2021 10:46 AM    Dog bite of finger  Assessment & Plan  - Dog bite of the proximal right long finger laterally and medially on 7/5/2021  - There was evidence of flexor tendon sheath infection on exam   - Continue antibiotics with plan to transition to oral antibiotic regimen on discharge  - Patient is status post right hand and long finger wound debridement, washout and closure on 07/06/2021   - Confirmed Tdap Updated on 7/5/21  - Continue multimodal analgesic regimen     - Continue to recommend elevation of right hand  - Continue wound soaks per orthopedics/hand surgery recommendations     - Outpatient follow-up with Orthopedic Hand surgery in 1 week for re-evaluation  * Flexor tenosynovitis of finger  Assessment & Plan  - Continue with current antibiotic regimen with plan to transition to oral Augmentin on discharge  - Management as outlined above  Discharge Summary - Trauma Service   Cecil Olivarez 23 y o  male MRN: [id number]  Unit/Bed#: S -01 Encounter: [id number]    Admission Date: 7/6/2021     Discharge Date: 7/8/2021    Admitting Diagnosis: Flexor tenosynovitis of finger [M65 9]  Wound check, abscess [Z51 89]  Dog bite of finger, subsequent encounter [S61 259D, W54  0XXD]    Discharge Diagnosis: See above  Attending and Service: Dr Asaf Hanson, Acute Care Surgical Services  Consulting Physician(s): Orthopedic / Hand Surgery  Imaging and Procedures Performed:     Right hand and long finger wound debridement, washout and closure on 07/06/2021  Hospital Course: Cecil Olivarez is a 45-year-old male who presented to the emergency department with increasing pain in his right hand and finger following a dog bite    He was previously evaluated in the emergency department and discharged with instructions to take Augmentin, but was unable to fill the prescription  The pain and swelling significantly worsened following his initial ER visit prompting his return  He denied any associated fevers or chills and or any recurrent injury to the hand  On his initial evaluation by the trauma service this encounter, his primary survey was unremarkable  On secondary survey, he was afebrile with normal vital signs; his right long finger was erythematous and edematous proximally at the PIP joint extending through the phalanx to the MCP joint, he was unable to flex or extend the finger, he had a + Kanavel sign and deficit noted in the median nerve distribution, there was a small puncture wound laterally and a smaller puncture wound medially over the proximal phalanx of the long finger with some sensation intact to light touch and normal capillary refill; the remainder of his secondary survey was unremarkable  His initial workup included labs  He was admitted to the trauma service status post dog bite wound to the finger with evidence of cellulitis and flexor tendon sheath infection  Broad-spectrum IV antibiotics were initiated, the patient was kept NPO, it was confirmed that his Tdap vaccination was updated on 07/05/2021 during his initial encounter, and Orthopedic/Hand surgery was consulted  He was also started on a multimodal analgesic regimen and IV fluids for hydration while NPO  Orthopedic/since surgery evaluated the patient and recommended operative intervention  The patient underwent right hand and long finger wound debridement, washout and closure on 07/06/2021  He tolerated the procedure well, and there were no immediate postoperative complications  During the postoperative course, he continued to be managed with IV antibiotics and was transitioned to an oral analgesic regimen    He underwent warm soapy water soaks 3 times daily and had serial exams with improvement in his pain, swelling, and resolution of his erythema  By 07/08/2021, he is deemed stable for discharge home on an oral antibiotic regimen  On discharge, the patient is instructed to follow-up with the patient's primary care provider to review the events of the patient's recent hospitalization  The patient is instructed to follow-up in the Trauma Clinic as needed  The patient is instructed to follow up with Orthopedic/Hand surgery in 7-10 days for postoperative re-evaluation  The patient should follow the provided discharge instructions  Condition at Discharge: good     Discharge instructions/Information to patient and family:   See after visit summary for information provided to patient and family  Provisions for Follow-Up Care:  See after visit summary for information related to follow-up care and any pertinent home health orders  Disposition: See After Visit Summary for discharge disposition information  Planned Readmission: No    Discharge Statement   I spent 24 minutes discharging the patient  This time was spent on the day of discharge  I had direct contact with the patient on the day of discharge  Additional documentation is required if more than 30 minutes were spent on discharge  Discharge Medications:  See after visit summary for reconciled discharge medications provided to patient and family        Len Moya PA-C  7/8/2021  09:02 AM

## 2021-07-08 NOTE — ASSESSMENT & PLAN NOTE
- Dog bite of the proximal right long finger laterally and medially on 7/5/2021  - There was evidence of flexor tendon sheath infection on exam   - Continue antibiotics with plan to transition to oral antibiotic regimen on discharge  - Patient is status post right hand and long finger wound debridement, washout and closure on 07/06/2021   - Confirmed Tdap Updated on 7/5/21  - Continue multimodal analgesic regimen     - Continue to recommend elevation of right hand  - Continue wound soaks per orthopedics/hand surgery recommendations     - Outpatient follow-up with Orthopedic Hand surgery in 1 week for re-evaluation

## 2021-07-08 NOTE — TELEPHONE ENCOUNTER
Hello,    Please advise if the following patient can be forced onto the schedule:    Yaneli Clay  [birth year]  MRN: [id number]  # [phone]  P/o, right middle finger, date of surgery 7/6   Follow up in 1 week  Armen/Jaleel    Email sent to [facility]

## 2021-07-08 NOTE — ASSESSMENT & PLAN NOTE
- Continue with current antibiotic regimen with plan to transition to oral Augmentin on discharge  - Management as outlined above

## 2021-07-08 NOTE — TELEPHONE ENCOUNTER
Dr Lois Patterson    [phone]    Patient had surgery today on his right middle finger  He states that on his discharge papers it states that he should follow up with the trauma center? Please advise

## 2021-07-08 NOTE — DISCHARGE INSTRUCTIONS
Discharge Instructions - Orthopedics      Weight Bearing Status:                                           - Weightbearing as tolerated on the right hand/right upper extremity  Pain:  - Continue analgesics as directed  Appt Instructions:   - If you do not have your appointment, please call the Orthopedic Surgery Clinic at [phone] to schedule an appointment as instructed  - Otherwise, followup as scheduled  - Contact the office sooner if you experience any increased numbness/tingling in the extremities  Miscellaneous:  - Please continue with warm soapy water soaks of your right hand and finger for 10 minutes at a time 3 times daily   - You may leave your right hand and finger open to air  If desired, it can be covered with a dry gauze dressing to be changed in between soaks  - Activity as tolerated  - You should remain out of work until cleared by Orthopedic surgery

## 2021-07-08 NOTE — PLAN OF CARE
Problem: PAIN - ADULT  Goal: Verbalizes/displays adequate comfort level or baseline comfort level  Description: Interventions:  - Encourage patient to monitor pain and request assistance  - Assess pain using appropriate pain scale  - Administer analgesics based on type and severity of pain and evaluate response  - Implement non-pharmacological measures as appropriate and evaluate response  - Consider cultural and social influences on pain and pain management  - Notify physician/advanced practitioner if interventions unsuccessful or patient reports new pain  Outcome: Completed     Problem: INFECTION - ADULT  Goal: Absence or prevention of progression during hospitalization  Description: INTERVENTIONS:  - Assess and monitor for signs and symptoms of infection  - Monitor lab/diagnostic results  - Monitor all insertion sites, i e  indwelling lines, tubes, and drains  - Monitor endotracheal if appropriate and nasal secretions for changes in amount and color  - Kimball appropriate cooling/warming therapies per order  - Administer medications as ordered  - Instruct and encourage patient and family to use good hand hygiene technique  - Identify and instruct in appropriate isolation precautions for identified infection/condition  Outcome: Completed  Goal: Absence of fever/infection during neutropenic period  Description: INTERVENTIONS:  - Monitor WBC    Outcome: Completed     Problem: SAFETY ADULT  Goal: Patient will remain free of falls  Description: INTERVENTIONS:  - Educate patient/family on patient safety including physical limitations  - Instruct patient to call for assistance with activity   - Consult OT/PT to assist with strengthening/mobility   - Keep Call bell within reach  - Keep bed low and locked with side rails adjusted as appropriate  - Keep care items and personal belongings within reach  - Initiate and maintain comfort rounds  - Make Fall Risk Sign visible to staff  - Offer Toileting every  Hours, in advance of need  - Initiate/Maintain alarm  - Obtain necessary fall risk management equipment:   - Apply yellow socks and bracelet for high fall risk patients  - Consider moving patient to room near nurses station  Outcome: Completed  Goal: Maintain or return to baseline ADL function  Description: INTERVENTIONS:  -  Assess patient's ability to carry out ADLs; assess patient's baseline for ADL function and identify physical deficits which impact ability to perform ADLs (bathing, care of mouth/teeth, toileting, grooming, dressing, etc )  - Assess/evaluate cause of self-care deficits   - Assess range of motion  - Assess patient's mobility; develop plan if impaired  - Assess patient's need for assistive devices and provide as appropriate  - Encourage maximum independence but intervene and supervise when necessary  - Involve family in performance of ADLs  - Assess for home care needs following discharge   - Consider OT consult to assist with ADL evaluation and planning for discharge  - Provide patient education as appropriate  Outcome: Completed  Goal: Maintains/Returns to pre admission functional level  Description: INTERVENTIONS:  - Perform BMAT or MOVE assessment daily    - Set and communicate daily mobility goal to care team and patient/family/caregiver  - Collaborate with rehabilitation services on mobility goals if consulted  - Perform Range of Motion  times a day  - Reposition patient every  hours    - Dangle patient  times a day  - Stand patient  times a day  - Ambulate patient  times a day  - Out of bed to chair  times a day   - Out of bed for meals imes a day  - Out of bed for toileting  - Record patient progress and toleration of activity level   Outcome: Completed     Problem: DISCHARGE PLANNING  Goal: Discharge to home or other facility with appropriate resources  Description: INTERVENTIONS:  - Identify barriers to discharge w/patient and caregiver  - Arrange for needed discharge resources and transportation as appropriate  - Identify discharge learning needs (meds, wound care, etc )  - Arrange for interpretive services to assist at discharge as needed  - Refer to Case Management Department for coordinating discharge planning if the patient needs post-hospital services based on physician/advanced practitioner order or complex needs related to functional status, cognitive ability, or social support system  Outcome: Completed     Problem: Nutrition/Hydration-ADULT  Goal: Nutrient/Hydration intake appropriate for improving, restoring or maintaining nutritional needs  Description: Monitor and assess patient's nutrition/hydration status for malnutrition  Collaborate with interdisciplinary team and initiate plan and interventions as ordered  Monitor patient's weight and dietary intake as ordered or per policy  Utilize nutrition screening tool and intervene as necessary  Determine patient's food preferences and provide high-protein, high-caloric foods as appropriate  INTERVENTIONS:  - Monitor oral intake, urinary output, labs, and treatment plans  - Assess nutrition and hydration status and recommend course of action  - Evaluate amount of meals eaten  - Assist patient with eating if necessary   - Allow adequate time for meals  - Recommend/ encourage appropriate diets, oral nutritional supplements, and vitamin/mineral supplements  - Order, calculate, and assess calorie counts as needed  - Recommend, monitor, and adjust tube feedings and TPN/PPN based on assessed needs  - Assess need for intravenous fluids  - Provide specific nutrition/hydration education as appropriate  - Include patient/family/caregiver in decisions related to nutrition  Outcome: Completed     Problem: Knowledge Deficit  Goal: Patient/family/caregiver demonstrates understanding of disease process, treatment plan, medications, and discharge instructions  Description: Complete learning assessment and assess knowledge base    Interventions:  - Provide teaching at level of understanding  - Provide teaching via preferred learning methods  Outcome: Completed

## 2021-07-08 NOTE — PROGRESS NOTES
[facility]  Progress Note Galindo Pablo Owen 1995, 23 y o  male MRN: [id number]  Unit/Bed#: S -01 Encounter: [id number]  Primary Care Provider: No primary care provider on file  Date and time admitted to hospital: 7/6/2021 10:46 AM    Dog bite of finger  Assessment & Plan  - Dog bite of the proximal right long finger laterally and medially on 7/5/2021  - There was evidence of flexor tendon sheath infection on exam   - Continue IV antibiotics with plan to transition to oral antibiotic regimen on discharge  - Patient is status post right hand and long finger wound debridement, washout and closure on 07/06/2021  - Confirmed Tdap Updated on 7/5/21  - Continue multimodal analgesic regimen     - Continue to recommend elevation of right hand  - Continue wound soaks per orthopedics/hand surgery recommendations     - Outpatient follow-up with Orthopedic Hand surgery in 1 week for re-evaluation  * Flexor tenosynovitis of finger  Assessment & Plan  - Continue with current antibiotic regimen with plan to transition to oral Augmentin on discharge  - Management as outlined above  Disposition:   Anticipate discharge home this morning pending re-evaluation by Orthopedic surgery  SUBJECTIVE:  Chief Complaint:   "I am feeling a bit better  "    Subjective:   Patient is overall feeling better  He notes his right hand and finger are still sore, but his pain is improving and controlled with his current medication regimen  He offers no new complaints this morning        OBJECTIVE:     Meds/Allergies     Current Facility-Administered Medications:     acetaminophen (TYLENOL) tablet 975 mg, 975 mg, Oral, Q8H [address], Darlene Peterson PA-C, 975 mg at 07/08/21 0056    ampicillin-sulbactam (UNASYN) 3 g in sodium chloride 0 9 % 100 mL IVPB, 3 g, Intravenous, Q6H, Drake Ly PA-C, Last Rate: 200 mL/hr at 07/08/21 0239, 3 g at 07/08/21 0239    enoxaparin (LOVENOX) subcutaneous injection 30 mg, 30 mg, Subcutaneous, Q12H, Ricardoda Violette, PA-C    HYDROmorphone (DILAUDID) injection 0 5 mg, 0 5 mg, Intravenous, Q1H PRN, Belynda Sheliains, PA-C, 0 5 mg at 07/07/21 1005    ibuprofen (MOTRIN) tablet 600 mg, 600 mg, Oral, Q6H PRN, Belynda Violette, PA-C, 600 mg at 07/06/21 2054    melatonin tablet 3 mg, 3 mg, Oral, HS, Darlene Peterson, PA-C, 3 mg at 07/08/21 0056    ondansetron (ZOFRAN) injection 4 mg, 4 mg, Intravenous, Q6H PRN, Ricardoda Violette, PA-C    oxyCODONE (ROXICODONE) IR tablet 2 5 mg, 2 5 mg, Oral, Q4H PRN, Belynda Sheliains, PA-C    oxyCODONE (ROXICODONE) IR tablet 5 mg, 5 mg, Oral, Q4H PRN, Belynda Sheliains, PA-C, 5 mg at 07/08/21 0056     Vitals:   Vitals:    07/08/21 0700   BP: 124/81   Pulse: 64   Resp: 16   Temp: 98 7 °F (37 1 °C)   SpO2: 97%       Intake/Output:  I/O       07/06 0701 - 07/07 0700 07/07 0701 - 07/08 0700 07/08 0701 - 07/09 0700    P  O   480     I V  (mL/kg) 1635 4 (20)      IV Piggyback 579 2 100     Total Intake(mL/kg) 2214 6 (27 1) 580 (7 1)     Net +2214 6 +580                   Nutrition/GI Proph/Bowel Reg:  NPO for possible re-exploration by Orthopedic surgery  Physical Exam:   GENERAL APPEARANCE: Patient in no acute distress  HEENT: NCAT; EOMs intact; Mucous membranes moist  CV: Regular rate and rhythm; no murmur/gallops/rubs appreciated  CHEST / LUNGS: Clear to auscultation; no wheezes/rales/rhonci  ABD: NABS; soft; non-distended; non-tender  :   Voiding spontaneously  EXT: +2 pulses bilaterally upper & lower extremities; no edema  Right palmar hand and right long finger wounds are healing with incisions closed in sutures in place, no erythema, mild to moderate tenderness with improved swelling and intact sensation distally  NEURO: GCS 15; no focal neurologic deficits; neurovascularly intact  SKIN: Warm, dry and well perfused; no rash; no jaundice  No further erythema of the right hand or long finger with wounds as noted above      Invasive Devices     Peripheral Intravenous Line            Peripheral IV 07/06/21 Left Antecubital 1 day                 Lab Results: Results: I have personally reviewed pertinent reports  Imaging/EKG Studies: Results: I have personally reviewed pertinent reports      Other Studies: N/A  VTE Prophylaxis: Sequential compression device (Venodyne)  and Enoxaparin (Lovenox)       Shana Pete PA-C  7/8/2021 07:52 AM

## 2021-07-08 NOTE — ASSESSMENT & PLAN NOTE
- Dog bite of the proximal right long finger laterally and medially on 7/5/2021  - There was evidence of flexor tendon sheath infection on exam   - Continue IV antibiotics with plan to transition to oral antibiotic regimen on discharge  - Patient is status post right hand and long finger wound debridement, washout and closure on 07/06/2021  - Confirmed Tdap Updated on 7/5/21  - Continue multimodal analgesic regimen     - Continue to recommend elevation of right hand  - Continue wound soaks per orthopedics/hand surgery recommendations     - Outpatient follow-up with Orthopedic Hand surgery in 1 week for re-evaluation

## 2021-07-08 NOTE — PROGRESS NOTES
Orthopedics         Subjective:  Patient seen and evaluated  Notes overall significant improvement with regards to his right long finger  He still notes mild pain swelling and stiffness which is improving  No numbness or tingling  No fevers or chills  Labs:  0   Lab Value Date/Time    HCT 46 6 07/06/2021 1213    HCT 45 4 07/05/2021 2035    HGB 15 3 07/06/2021 1213    HGB 15 1 07/05/2021 2035    INR 0 96 07/05/2021 2035    WBC 7 25 07/06/2021 1213    WBC 10 97 (H) 07/05/2021 2035       Meds:    Current Facility-Administered Medications:     acetaminophen (TYLENOL) tablet 975 mg, 975 mg, Oral, Q8H [facility], GUMARO Christian-C, 975 mg at 07/08/21 0056    ampicillin-sulbactam (UNASYN) 3 g in sodium chloride 0 9 % 100 mL IVPB, 3 g, Intravenous, Q6H, Adan Blocker, PA-C, Last Rate: 200 mL/hr at 07/08/21 0239, 3 g at 07/08/21 0239    enoxaparin (LOVENOX) subcutaneous injection 30 mg, 30 mg, Subcutaneous, Q12H, Adan Blocker, PA-C    HYDROmorphone (DILAUDID) injection 0 5 mg, 0 5 mg, Intravenous, Q1H PRN, Adan Blocker, PA-C, 0 5 mg at 07/07/21 1005    ibuprofen (MOTRIN) tablet 600 mg, 600 mg, Oral, Q6H PRN, Adan Blocker, PA-C, 600 mg at 07/06/21 2054    melatonin tablet 3 mg, 3 mg, Oral, HS, Darlene Peterson, PA-C, 3 mg at 07/08/21 0056    ondansetron (ZOFRAN) injection 4 mg, 4 mg, Intravenous, Q6H PRN, Adan Blocker, PA-C    oxyCODONE (ROXICODONE) IR tablet 2 5 mg, 2 5 mg, Oral, Q4H PRN, Adan Blocker, PA-C    oxyCODONE (ROXICODONE) IR tablet 5 mg, 5 mg, Oral, Q4H PRN, Adan Blocker, PA-C, 5 mg at 07/08/21 0056    Physical exam:  Vitals:    07/08/21 0700   BP: 124/81   Pulse: 64   Resp: 16   Temp: 98 7 °F (37 1 °C)   SpO2: 97%     Right long finger:  · Incisions C/D/I  No erythema or drainage  · Minimal soft tissue swelling about the digit  · No significant tenderness to palpation  · Digital range of motion is intact and limited secondary to swelling    No significant pain with passive flexion or extension of the digit  · Sensation intact distally  · Brisk capillary refill    Assessment: 26 y o male POD 2 s/p Right Long Finger I&D for flexor tenosynovitis status post dog bite    Plan:  · Pain control  · Antibiotics per primary team  · Encouraged digital range of motion  · Continue warm soapy soaks TID  · Stable for discharge from orthopedic standpoint  No need for repeat I and D at this time    Follow-up as outpatient with Dr Bertha Blackwell on 7/12/21    Becki Ramirez PA-C

## 2021-07-09 LAB — BACTERIA SPEC ANAEROBE CULT: NO GROWTH

## 2021-07-09 NOTE — UTILIZATION REVIEW
Notification of Discharge   This is a Notification of Discharge from our facility [address]  Please be advised that this patient has been discharge from our facility  Below you will find the admission and discharge date and time including the patients disposition  UTILIZATION REVIEW CONTACT:  Leno Peace  Utilization   Network Utilization Review Department  Phone: [phone] x carefully listen to the prompts  All voicemails are confidential   Email: [email or web address]     PHYSICIAN ADVISORY SERVICES:  FOR OWBR-LY-CP[facility] REVIEW - MEDICAL NECESSITY DENIAL  Phone: [phone]  Fax: [phone]  Email: [email or web address]     PRESENTATION DATE: 7/6/2021 10:46 AM  OBERVATION ADMISSION DATE:   INPATIENT ADMISSION DATE: 7/6/21  5:19 PM   DISCHARGE DATE: 7/8/2021 11:50 AM  DISPOSITION: Home/Self Care Home/Self Care      IMPORTANT INFORMATION:  Send all requests for admission clinical reviews, approved or denied determinations and any other requests to dedicated fax number below belonging to the campus where the patient is receiving treatment   List of dedicated fax numbers:  [address] DENIALS (Administrative/Medical Necessity) [phone]   [address] (Maternity/NICU/Pediatrics) [phone]   Lacho Burrows [phone]   Yogi Champagne [phone]   Melinda Dillon [phone]   [address] [phone]   [facility]  [phone]   [address] [facility]  [address] [facility] [address] [facility] [phone]

## 2021-07-10 LAB
BACTERIA WND AEROBE CULT: NO GROWTH
GRAM STN SPEC: NORMAL
GRAM STN SPEC: NORMAL

## 2021-07-11 LAB
BACTERIA BLD CULT: NORMAL
BACTERIA BLD CULT: NORMAL

## 2021-07-14 VITALS
SYSTOLIC BLOOD PRESSURE: 127 MMHG | DIASTOLIC BLOOD PRESSURE: 71 MMHG | BODY MASS INDEX: 28.25 KG/M2 | HEIGHT: 67 IN | HEART RATE: 75 BPM | WEIGHT: 180 LBS

## 2021-07-14 DIAGNOSIS — M65.9 FLEXOR TENOSYNOVITIS OF FINGER: Primary | ICD-10-CM

## 2021-07-14 PROCEDURE — 99024 POSTOP FOLLOW-UP VISIT: CPT | Performed by: SURGERY

## 2021-07-14 NOTE — LETTER
July 14, 2021     Patient: Thomas Cantu   YOB: 1995   Date of Visit: 7/14/2021       To Whom it May Concern:    Thomas Cantu is under my professional care  He was seen in my office on 7/14/2021  He will be out of work until seen back in our office in about one week  Restrictions will be re-evaluated at that time  If you have any questions or concerns, please don't hesitate to call           Sincerely,          Fran Mejias MD        CC: Thomas Cantu

## 2021-07-14 NOTE — PROGRESS NOTES
Assessment/Plan:  Patient ID: Coretta Owen 23 y o  male   Surgery: Debridement Hand/finger (wash Out) - Right  Date of Surgery: 7/6/2021    Sutures removed today, finger is looking good with no residual signs of infection  Begin scar massage  Work on ROM   I would like him to avoid work for another week to ensure the wounds heal up completely as he does a lot of work with his hands  Will re-check later next week once abx have been completed - anticipate full duty at that time  If still lacking motion next week will consider therapy    Follow Up:  1 week    To Do Next Visit:   re-evaluate      CHIEF COMPLAINT:  Chief Complaint   Patient presents with    Right Middle Finger - Post-op, Pain         SUBJECTIVE:  Santa Coelho is a 23y o  year old male who presents for follow up after Debridement Hand/finger (wash Out) - Right  Today patient has no significant pain, no fevers or chills, no paresthesias         PHYSICAL EXAMINATION:  General: well developed and well nourished, alert, oriented times 3 and appears comfortable  Psychiatric: Normal    MUSCULOSKELETAL EXAMINATION:  Incision: Clean, dry, intact  Surgery Site: normal, no evidence of infection   Range of Motion: slightly limited due to stiffness but near full  passively full  Neurovascular status: Neuro intact, good cap refill  Activity Restrictions: begin scar massage, continue working on motion   Done today: Sutures out      STUDIES REVIEWED:  No Studies to review       PROCEDURES PERFORMED:  Procedures  No Procedures performed today      Ana Catalan PA-C

## 2021-07-22 VITALS
BODY MASS INDEX: 28.25 KG/M2 | RESPIRATION RATE: 16 BRPM | DIASTOLIC BLOOD PRESSURE: 74 MMHG | HEIGHT: 67 IN | SYSTOLIC BLOOD PRESSURE: 121 MMHG | HEART RATE: 77 BPM | WEIGHT: 180 LBS

## 2021-07-22 DIAGNOSIS — M65.9 FLEXOR TENOSYNOVITIS OF FINGER: Primary | ICD-10-CM

## 2021-07-22 PROCEDURE — 99024 POSTOP FOLLOW-UP VISIT: CPT | Performed by: SURGERY

## 2021-07-22 NOTE — PROGRESS NOTES
Assessment/Plan:  Patient ID: Cathleen Owen 23 y o  male   Surgery: Debridement Hand/finger (wash Out) - Right long finger  Date of Surgery: 7/6/2021    Resume activities as tolerated and scar tissue massage  Return to work note provided for next week  Follow Up:  6  week(s)    To Do Next Visit:  Repeat evaluation      CHIEF COMPLAINT:  Chief Complaint   Patient presents with    Right Middle Finger - Pain, Follow-up         SUBJECTIVE:  Cecil Olivarez is a 23y o  year old male who presents for follow up after Debridement Hand/finger (wash Out) - Right long finger  Today patient has discomfort with pushing off motions  Since last week he has completed his PO ABX  His PCP has referred him to OT  PHYSICAL EXAMINATION:  General: well developed and well nourished, alert, oriented times 3 and appears comfortable  Psychiatric: Normal    MUSCULOSKELETAL EXAMINATION:  Incision: Clean, dry, intact and scab on the distal wound  Surgery Site: swelling present and no erythema or drainage   No warmth  Range of Motion: full composite fist possible and discomfort with hyperextension or push-off motions  Neurovascular status: Neuro intact, good cap refill  Activity Restrictions: No restrictions         STUDIES REVIEWED:  No Studies to review      PROCEDURES PERFORMED:  Procedures  No Procedures performed today    Scribe Attestation    I,:  Marcos Dougherty am acting as a scribe while in the presence of the attending physician :       I,:  Rosezella Curling, MD personally performed the services described in this documentation    as scribed in my presence :

## 2021-07-22 NOTE — LETTER
July 22, 2021     Patient: Jonn Bowling   YOB: 1995   Date of Visit: 7/22/2021       To Whom it May Concern:    Jonn Bowling is under my professional care  He was seen in my office on 7/22/2021  He is able to return to work as of 7/28/2021 without restrictions  If you have any questions or concerns, please don't hesitate to call           Sincerely,          Faviola Alvarez MD        CC: No Recipients

## 2021-09-02 VITALS
RESPIRATION RATE: 16 BRPM | WEIGHT: 180 LBS | DIASTOLIC BLOOD PRESSURE: 78 MMHG | SYSTOLIC BLOOD PRESSURE: 125 MMHG | HEIGHT: 67 IN | BODY MASS INDEX: 28.25 KG/M2 | HEART RATE: 76 BPM

## 2021-09-02 DIAGNOSIS — Z98.890 S/P MUSCULOSKELETAL SYSTEM SURGERY: Primary | ICD-10-CM

## 2021-09-02 PROCEDURE — 99024 POSTOP FOLLOW-UP VISIT: CPT | Performed by: SURGERY

## 2021-09-02 NOTE — PROGRESS NOTES
Assessment/Plan:  Patient ID: Ellie Tellezana laurashira 23 y o  male   Surgery: Debridement Hand/finger (wash Out) - Right  Date of Surgery: 7/6/2021    [facility]  2 months s/p above surgery, right long finger  Overall Ellie Mallory is doing well  He has full flexion and full extension at this time  He was advised to continue scar massage  Ellie Mallory may experience increased pain to the finger for the next winter or two  Follow Up:  PRN    To Do Next Visit:        CHIEF COMPLAINT:  Chief Complaint   Patient presents with    Right Middle Finger - Pain, Follow-up         SUBJECTIVE:  An Lucero is a 23y o  year old male who presents for follow up after Debridement Hand/finger (wash Out) - Right  Overall Ellie Mallory is doing well  He has completed therapy  He continue to work on motion and scar modalities at home  He notes slight pain but overall is improve  He feels like flexion is full but hyperextension is slightly limited and painful         PHYSICAL EXAMINATION:  General: well developed and well nourished, alert, oriented times 3 and appears comfortable  Psychiatric: Normal    MUSCULOSKELETAL EXAMINATION:  Incision: healed  Surgery Site: normal, no evidence of infection   Range of Motion: As expected and full composite fist possible  Neurovascular status: Neuro intact, good cap refill  Activity Restrictions: No restrictions      STUDIES REVIEWED:  No Studies to review      PROCEDURES PERFORMED:  Procedures  No Procedures performed today    Scribe Attestation    I,:  Natalia Correa am acting as a scribe while in the presence of the attending physician :       I,:  Jose Martinez MD personally performed the services described in this documentation    as scribed in my presence :

## (undated) DEVICE — MINI BLADE ROUND TIP SHARP ON ONE SIDE

## (undated) DEVICE — TUBING SUCTION 5MM X 12 FT

## (undated) DEVICE — GLOVE SRG BIOGEL 6.5

## (undated) DEVICE — STRETCH BANDAGE: Brand: CURITY

## (undated) DEVICE — IMPERVIOUS STOCKINETTE: Brand: DEROYAL

## (undated) DEVICE — CHLORAPREP HI-LITE 26ML ORANGE

## (undated) DEVICE — GLOVE INDICATOR PI UNDERGLOVE SZ 6.5 BLUE

## (undated) DEVICE — IODOFORM PACKING STRIP: Brand: CURITY

## (undated) DEVICE — GAUZE SPONGES,16 PLY: Brand: CURITY

## (undated) DEVICE — GLOVE INDICATOR PI UNDERGLOVE SZ 7 BLUE

## (undated) DEVICE — SPONGE PVP SCRUB WING STERILE

## (undated) DEVICE — OCCLUSIVE GAUZE STRIP,3% BISMUTH TRIBROMOPHENATE IN PETROLATUM BLEND: Brand: XEROFORM

## (undated) DEVICE — CUFF TOURNIQUET 18 X 4 IN QUICK CONNECT DISP 1 BLADDER

## (undated) DEVICE — LIGHT HANDLE COVER SLEEVE DISP BLUE STELLAR

## (undated) DEVICE — GLOVE SRG BIOGEL 7

## (undated) DEVICE — STERILE BETHLEHEM PLASTIC HAND: Brand: CARDINAL HEALTH

## (undated) DEVICE — INTENDED FOR TISSUE SEPARATION, AND OTHER PROCEDURES THAT REQUIRE A SHARP SURGICAL BLADE TO PUNCTURE OR CUT.: Brand: BARD-PARKER ® CARBON RIB-BACK BLADES